# Patient Record
Sex: FEMALE | Race: BLACK OR AFRICAN AMERICAN | NOT HISPANIC OR LATINO | Employment: FULL TIME | URBAN - METROPOLITAN AREA
[De-identification: names, ages, dates, MRNs, and addresses within clinical notes are randomized per-mention and may not be internally consistent; named-entity substitution may affect disease eponyms.]

---

## 2023-03-07 ENCOUNTER — TELEPHONE (OUTPATIENT)
Dept: PRIMARY CARE | Facility: CLINIC | Age: 33
End: 2023-03-07
Payer: COMMERCIAL

## 2023-03-07 NOTE — TELEPHONE ENCOUNTER
Patient called checking on the status on her FMLA paperwork. Patient states that she gave it to someone at the front and I didn't see it in your mailbox. Please reach out to patient regarding this matter. Patient states that this is time sensitive.

## 2023-03-09 LAB
CREATININE (MG/DL) IN SER/PLAS: 0.69 MG/DL (ref 0.5–1.05)
GFR FEMALE: >90 ML/MIN/1.73M2

## 2023-04-07 ENCOUNTER — CLINICAL SUPPORT (OUTPATIENT)
Dept: PRIMARY CARE | Facility: CLINIC | Age: 33
End: 2023-04-07
Payer: COMMERCIAL

## 2023-04-07 VITALS
OXYGEN SATURATION: 98 % | HEART RATE: 99 BPM | DIASTOLIC BLOOD PRESSURE: 89 MMHG | SYSTOLIC BLOOD PRESSURE: 132 MMHG | RESPIRATION RATE: 18 BRPM

## 2023-04-07 DIAGNOSIS — Z30.42 DEPO-PROVERA CONTRACEPTIVE STATUS: ICD-10-CM

## 2023-04-07 DIAGNOSIS — Z30.42 DEPO-PROVERA CONTRACEPTIVE STATUS: Primary | ICD-10-CM

## 2023-04-07 PROCEDURE — 96372 THER/PROPH/DIAG INJ SC/IM: CPT | Performed by: FAMILY MEDICINE

## 2023-04-07 RX ORDER — IBUPROFEN 800 MG/1
800 TABLET ORAL
COMMUNITY
Start: 2015-02-23

## 2023-04-07 RX ORDER — MEDROXYPROGESTERONE ACETATE 150 MG/ML
150 INJECTION, SUSPENSION INTRAMUSCULAR ONCE
Status: COMPLETED | OUTPATIENT
Start: 2023-04-07 | End: 2023-04-07

## 2023-04-07 RX ORDER — MEDROXYPROGESTERONE ACETATE 150 MG/ML
150 INJECTION, SUSPENSION INTRAMUSCULAR
COMMUNITY
Start: 2021-08-06 | End: 2023-04-07 | Stop reason: SDUPTHER

## 2023-04-07 RX ORDER — AMLODIPINE BESYLATE 5 MG/1
5 TABLET ORAL DAILY
COMMUNITY
End: 2023-04-21 | Stop reason: ALTCHOICE

## 2023-04-07 RX ORDER — ACETAMINOPHEN 500 MG
1 TABLET ORAL DAILY
COMMUNITY
Start: 2021-05-20 | End: 2023-04-21 | Stop reason: SDUPTHER

## 2023-04-07 RX ORDER — MEDROXYPROGESTERONE ACETATE 150 MG/ML
150 INJECTION, SUSPENSION INTRAMUSCULAR
Qty: 1 ML | Refills: 0 | Status: SHIPPED | OUTPATIENT
Start: 2023-04-07

## 2023-04-07 RX ORDER — PANTOPRAZOLE SODIUM 40 MG/1
40 TABLET, DELAYED RELEASE ORAL DAILY
COMMUNITY
Start: 2023-03-09 | End: 2023-12-06 | Stop reason: ALTCHOICE

## 2023-04-07 RX ORDER — TRETINOIN 0.5 MG/G
CREAM TOPICAL
COMMUNITY
Start: 2022-08-07 | End: 2023-12-06 | Stop reason: SDUPTHER

## 2023-04-07 RX ORDER — ERYTHROMYCIN AND BENZOYL PEROXIDE 30; 50 MG/G; MG/G
GEL TOPICAL
COMMUNITY
Start: 2022-04-19 | End: 2023-12-06 | Stop reason: SDUPTHER

## 2023-04-07 RX ORDER — EPINEPHRINE 0.3 MG/.3ML
INJECTION SUBCUTANEOUS
COMMUNITY

## 2023-04-07 RX ORDER — SERTRALINE HYDROCHLORIDE 25 MG/1
25 TABLET, FILM COATED ORAL DAILY
COMMUNITY
Start: 2022-11-29 | End: 2023-04-21 | Stop reason: SDUPTHER

## 2023-04-07 RX ORDER — METOPROLOL TARTRATE 100 MG/1
TABLET ORAL
COMMUNITY
Start: 2023-03-09 | End: 2023-04-21 | Stop reason: ALTCHOICE

## 2023-04-07 RX ORDER — MEDROXYPROGESTERONE ACETATE 150 MG/ML
150 INJECTION, SUSPENSION INTRAMUSCULAR ONCE
Status: CANCELLED | OUTPATIENT
Start: 2023-04-07 | End: 2023-04-07

## 2023-04-07 RX ADMIN — MEDROXYPROGESTERONE ACETATE 150 MG: 150 INJECTION, SUSPENSION INTRAMUSCULAR at 14:01

## 2023-04-07 ASSESSMENT — PAIN SCALES - GENERAL: PAINLEVEL: 0-NO PAIN

## 2023-04-07 NOTE — PROGRESS NOTES
Patient here for Depo injection. Patient received injection in Left buttocks. Tolerated it well. Patient received the calender for her next injection timeframe.

## 2023-04-11 ENCOUNTER — APPOINTMENT (OUTPATIENT)
Dept: PRIMARY CARE | Facility: CLINIC | Age: 33
End: 2023-04-11
Payer: COMMERCIAL

## 2023-04-11 PROBLEM — L70.9 ACNE: Status: ACTIVE | Noted: 2023-04-11

## 2023-04-11 PROBLEM — I10 HYPERTENSION: Status: ACTIVE | Noted: 2023-04-11

## 2023-04-11 PROBLEM — N89.8 VAGINAL DISCHARGE: Status: ACTIVE | Noted: 2023-04-11

## 2023-04-11 PROBLEM — T78.03XA ANAPHYLAXIS DUE TO FISH: Status: ACTIVE | Noted: 2023-04-11

## 2023-04-11 PROBLEM — F43.10 POST TRAUMATIC STRESS DISORDER (PTSD): Status: ACTIVE | Noted: 2023-04-11

## 2023-04-11 PROBLEM — G56.01 CARPAL TUNNEL SYNDROME OF RIGHT WRIST: Status: ACTIVE | Noted: 2023-04-11

## 2023-04-11 PROBLEM — F17.200 TOBACCO DEPENDENCE: Status: ACTIVE | Noted: 2023-04-11

## 2023-04-11 PROBLEM — F32.A DEPRESSION: Status: ACTIVE | Noted: 2023-04-11

## 2023-04-11 PROBLEM — E55.9 VITAMIN D DEFICIENCY: Status: ACTIVE | Noted: 2023-04-11

## 2023-04-11 PROBLEM — B96.89 BACTERIAL VAGINOSIS: Status: ACTIVE | Noted: 2023-04-11

## 2023-04-11 PROBLEM — M65.9 FLEXOR TENOSYNOVITIS OF FINGER: Status: ACTIVE | Noted: 2023-04-11

## 2023-04-11 PROBLEM — R07.9 CHEST PAIN: Status: ACTIVE | Noted: 2023-04-11

## 2023-04-11 PROBLEM — B35.3 TINEA PEDIS: Status: ACTIVE | Noted: 2023-04-11

## 2023-04-11 PROBLEM — F41.9 ANXIETY: Status: ACTIVE | Noted: 2023-04-11

## 2023-04-11 PROBLEM — N76.0 BACTERIAL VAGINOSIS: Status: ACTIVE | Noted: 2023-04-11

## 2023-04-11 PROBLEM — M54.50 LOW BACK PAIN: Status: ACTIVE | Noted: 2023-04-11

## 2023-04-11 PROBLEM — M65.949 FLEXOR TENOSYNOVITIS OF FINGER: Status: ACTIVE | Noted: 2023-04-11

## 2023-04-11 PROBLEM — R12 HEART BURN: Status: ACTIVE | Noted: 2023-04-11

## 2023-04-21 ENCOUNTER — OFFICE VISIT (OUTPATIENT)
Dept: PRIMARY CARE | Facility: CLINIC | Age: 33
End: 2023-04-21
Payer: COMMERCIAL

## 2023-04-21 VITALS
HEART RATE: 117 BPM | SYSTOLIC BLOOD PRESSURE: 139 MMHG | BODY MASS INDEX: 27.32 KG/M2 | WEIGHT: 170 LBS | DIASTOLIC BLOOD PRESSURE: 94 MMHG | HEIGHT: 66 IN | OXYGEN SATURATION: 97 % | TEMPERATURE: 97.8 F

## 2023-04-21 DIAGNOSIS — E55.9 VITAMIN D DEFICIENCY: ICD-10-CM

## 2023-04-21 DIAGNOSIS — F41.9 ANXIETY: ICD-10-CM

## 2023-04-21 DIAGNOSIS — I10 PRIMARY HYPERTENSION: Primary | ICD-10-CM

## 2023-04-21 PROCEDURE — 3075F SYST BP GE 130 - 139MM HG: CPT | Performed by: STUDENT IN AN ORGANIZED HEALTH CARE EDUCATION/TRAINING PROGRAM

## 2023-04-21 PROCEDURE — 1036F TOBACCO NON-USER: CPT | Performed by: STUDENT IN AN ORGANIZED HEALTH CARE EDUCATION/TRAINING PROGRAM

## 2023-04-21 PROCEDURE — 99213 OFFICE O/P EST LOW 20 MIN: CPT | Performed by: STUDENT IN AN ORGANIZED HEALTH CARE EDUCATION/TRAINING PROGRAM

## 2023-04-21 PROCEDURE — 3080F DIAST BP >= 90 MM HG: CPT | Performed by: STUDENT IN AN ORGANIZED HEALTH CARE EDUCATION/TRAINING PROGRAM

## 2023-04-21 RX ORDER — ACETAMINOPHEN 500 MG
50 TABLET ORAL DAILY
Qty: 30 CAPSULE | Refills: 3 | Status: SHIPPED | OUTPATIENT
Start: 2023-04-21 | End: 2023-12-13 | Stop reason: SDUPTHER

## 2023-04-21 RX ORDER — SERTRALINE HYDROCHLORIDE 25 MG/1
25 TABLET, FILM COATED ORAL DAILY
Qty: 30 TABLET | Refills: 3 | Status: SHIPPED | OUTPATIENT
Start: 2023-04-21 | End: 2023-10-12 | Stop reason: HOSPADM

## 2023-04-21 RX ORDER — CHLORTHALIDONE 25 MG/1
25 TABLET ORAL DAILY
Qty: 30 TABLET | Refills: 11 | Status: SHIPPED | OUTPATIENT
Start: 2023-04-21 | End: 2023-12-14 | Stop reason: SDUPTHER

## 2023-04-21 ASSESSMENT — PAIN SCALES - GENERAL: PAINLEVEL: 0-NO PAIN

## 2023-04-21 NOTE — PROGRESS NOTES
Ms. Montaño is a 32 year old F with PMH notable for anxiety/depression, chronic low back pain, R carpal tunnel syndrome, and HTN presenting today for HTN follow up.     #HTN  - IO BP of 139/94  - Patient initiated on Amlodipine   - reports new side effects of ankle swelling and increasing underlining anxiety  - does not measure BP routinely at home  - denies, SOB, chest pain, palpitation, or peripheral edema     SOCIAL HISTORY:   Tobacco denies  ETOH denies  Drugs denies  Occupation: [employer]     REVIEW OF SYSTEMS:   No fevers, chills, weight is stable  No skin lesions  No HA, SZ, LOC  No CP, chest pressure  No cough, SOB   No ABD pain, nausea, vomiting  No urinary urgency, dysuria, urinary frequency  No BRBPR, melena, hematuria  No bleeding      PHYSICAL EXAMINATION:   Gen: NAD, non-toxic  HEENT: WNL  Chest: no inc WOB, CTABL, no wheeze/crackles/rhonchi   CVS: RRR, no murur appreciated   Abd: soft, NT/ND, +BS  Ext: no edema, nontender  Skin: Dry, warm, good condition  Neuro: grossly normal, CN intact, KIERRA x 4  Psy: Mood and affect appropriate      ASSESSMENT:  Ms. Montaño is a 32 year old F with PMH notable for anxiety/depression, chronic low back pain, R carpal tunnel syndrome, and HTN presenting today for HTN follow up. Plan as below:     PLAN:  - FMLA updated  - Will change Amlodipine to Chlorthalidone 25 mg every day   - Patient to keep home log for BP goal of <120/80  - RFP will be checked on next visit   - Medication list reviewed and renewed as needed     RTC: 1 month for BP follow up    Discussed with Dr. Mcgarry,     Sascha Linton MD  Family Medicine, PGY3  [facility]

## 2023-04-24 NOTE — PROGRESS NOTES
I reviewed with the resident the medical history and the resident’s findings on physical examination.  I discussed with the resident the patient’s diagnosis and concur with the treatment plan as documented in the resident note.     José Miguel Mcgarry MD       Patient : Darwin Archer Jr. Age: 64 year old Sex: male   MRN: [id number] Encounter Date: 5/21/2021      History     Chief Complaint   Patient presents with   • Penis Pain     HPI    64-year-old gentleman reports the ER complaint of noting blood on his underwear from the tip of his penis this evening.  Patient has history of cerebral palsy he is ambulatory at times for short distance but mainly gets around in a wheelchair.  Reports that he was out shopping for long hours today when he got home he felt urge to void on his drive home took him several minutes to get his wheelchair out of his vehicle to get inside at which time he decided tip pinch is penis to avoid urinating on himself as he got into the house.  Reports that he was pinching the tip of his penis for roughly 4 minutes and then after voiding he is noticed a small amount of blood at the tip of his penis.  This evening he has had blood on his undergarments on a few occasions.  He denies any foul-smelling or odd color urine.  Denies flank pain.  Does report some burning from the tip of his penis now on urination.  Denies blood in his urine however sees blood in his undergarments.  Denies tenderness or pain to the penis at rest or testicles.    Allergies   Allergen Reactions   • Msg [Monosodium Glutamate   (Food Or Med)] Other (See Comments)     Abdominal pain, diarrhea   • Peppers   (Food) Other (See Comments)     Diarrhea, abdominal pain        Discharge Medication List as of 5/22/2021 12:24 AM      Prior to Admission Medications    Details   loratadine (CLARITIN) 10 MG tablet Take 10 mg by mouth daily.Historical Med      Multiple Vitamins-Minerals (vitamin - therapeutic multivitamins w/minerals) tablet Take by mouth daily.Historical Med      Ascorbic Acid (Vitamin C) 500 MG Cap Historical Med      cholecalciferol (cholecalciferol) 25 mcg (1,000 units) tablet Take by mouth daily.Historical Med, Oral, DAILYPer the FDA, the units of measure for vitamin D  have changed from international units (IUs) to metric units (eg, micrograms or milligrams). It is advised to order in metric units. See below for common equi valencies:   10 mcg = 400 units   25 mcg = 1,000 units   125 mcg = 5,000 units   200 mcg = 8,000 units   1.25 mg = 50,000 units      acetaminophen (TYLENOL) 500 MG tablet Take 500 mg by mouth as needed for Pain.Historical Med      eszopiclone (LUNESTA) 1 MG Tab Take 1 tablet by mouth at bedtime as needed (insomnia).Eprescribe, Disp-3 tablet, R-0      gabapentin (NEURONTIN) 300 MG capsule 300 mg by mouth in morning 900 mg hsEprescribe, Disp-120 capsule, R-3      lamoTRIgine (LaMICtal) 25 MG tablet Take 2 tablets by mouth daily.Eprescribe, Disp-60 tablet, R-3      empagliflozin (Jardiance) 10 MG tablet Take 1 tablet by mouth daily (before breakfast).Eprescribe, Disp-90 tablet,R-1      metFORMIN (GLUCOPHAGE-XR) 500 MG 24 hr tablet Take 4 tablets by mouth daily (with dinner).Eprescribe, Disp-360 tablet,R-1      atorvastatin (LIPITOR) 40 MG tablet Take 1 tablet by mouth daily.Eprescribe, Disp-90 tablet,R-1      levothyroxine 50 MCG tablet Take 1 tablet by mouth daily.Eprescribe, Disp-90 tablet,R-1      finasteride (PROSCAR) 5 MG tablet Take 1 tablet by mouth daily.Eprescribe, Disp-90 tablet,R-1      oxybutynin (DITROPAN XL) 15 MG 24 hr tablet Take 1 tablet by mouth daily.Eprescribe, Disp-90 tablet,R-1      omeprazole (PrilOSEC) 20 MG capsule Take 1 capsule by mouth daily.Script Not Printed, Disp-90 capsule,R-3      fluoxetine (PROzac) 20 MG capsule Take 1 capsule by mouth daily.Eprescribe, Disp-90 capsule,R-3Please cancel 40 mg prescription  Attempting dose reduction and titration lamotrigine into the picture      pramipexole (MIRAPEX) 0.25 MG tablet Take 1 tablet by mouth every other day.Eprescribe, Disp-10 tablet,R-0Attempting cross titration to gabapentin      NON FORMULARY Tumeric 500 mg 1 in the morningHistorical Med      DISPENSE 1 Units by Other route daily.  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   • Blood transfusion without reported diagnosis    • BPH (benign prostatic hyperplasia)    • Branch retinal vein occlusion, right eye    • Chronic sinusitis    • Colon polyp    • CP (cerebral palsy) (CMS/HCC)    • Depressive disorder    • Diabetic retinopathy (CMS/HCC)    • Diverticulosis    • Fatty liver    • GERD (gastroesophageal reflux disease)    • Hepatitis B, chronic (CMS/HCC)    • Hyperlipidemia    • Liver disease     Hepatitis B   • Low testosterone    • Neuromuscular disorder (CMS/HCC)    • JOSEPH (obstructive sleep apnea)     CPAP   • Otitis media    • Overactive bladder    • Pneumonia    • RLS (restless legs syndrome)    • Sleep-related hypoventilation due to medical condition    • Small bowel obstruction (CMS/HCC)    • Subclinical hypothyroidism    • Tinea pedis    • Type 2 diabetes mellitus (CMS/HCC)        Past Surgical History:   Procedure Laterality Date   • COLONOSCOPY  09/11/2018    Per Dr. Poe: Colon polyp. The pathology results demonstrated Tubular adenoma. Schedule repeat Colonoscopy in 5 years.   • COLONOSCOPY REMOVE LESION BY SNARE  7/19/12   • COLONOSCOPY W/ POLYPECTOMY  7/14/15   • ESOPHAGOGASTRODUODENOSCOPY  04/16/2021    Repeat in 1 year - Dr. Poe   • HAMSTRING LENGTHENING  1968   • HERNIA REPAIR      hernia done, then came back 6 months for scar tissue removal   • LUMBAR FUSION  1991   • TRANSURETHRAL RESECTION OF PROSTATE  2014       Family History   Problem Relation Age of Onset   • High cholesterol Mother    • Cancer, Breast Mother    • Psychiatric Mother    • Diabetes Father    • Heart disease Father    • High cholesterol Father    • Cancer Father    • Hypertension Father    • Liver Disease Father    • High cholesterol Sister    • Heart Sister    •  Hypertension Sister    • Sleep Apnea Sister    • Hyperlipidemia Maternal Grandmother    • Dementia/Alzheimers Maternal Grandmother    • Hyperlipidemia Maternal Grandfather    • Sleep Apnea Brother    • HIV Brother    • Heart Brother    • Cancer, Prostate Brother        Social History     Tobacco Use   • Smoking status: Never Smoker   • Smokeless tobacco: Never Used   Substance Use Topics   • Alcohol use: No   • Drug use: No     Comment: Soda 2 soda in the AM and caffeinated drinks in the afternoon. Last at 4 PM       E-cigarette/Vaping   • E-Cigarette/Vaping Use Never Used      E-Cigarette/Vaping Substances & Devices       Review of Systems   Constitutional: Negative for chills, fatigue and fever.   Respiratory: Negative for cough, chest tightness, shortness of breath and wheezing.    Cardiovascular: Negative for chest pain and palpitations.   Gastrointestinal: Negative for abdominal pain, diarrhea, nausea and vomiting.   Genitourinary: Positive for dysuria. Negative for decreased urine volume, difficulty urinating, flank pain, frequency, hematuria (denies hematuria but notes blood from tip of penis on undergarments.), penile pain, penile swelling, scrotal swelling, testicular pain and urgency.   Musculoskeletal: Negative for arthralgias.   Skin: Negative for rash.   Neurological: Negative for dizziness, syncope, weakness, light-headedness and headaches.   All other systems reviewed and are negative.      Physical Exam     ED Triage Vitals [05/21/21 2328]   ED Triage Vitals Group      Temp 98.3 °F (36.8 °C)      Heart Rate 81      Resp 16      BP (!) 159/89      SpO2 92 %      EtCO2 mmHg       Height 5' 7\" (1.702 m)      Weight 200 lb (90.7 kg)      Weight Scale Used Standing scale      BMI (Calculated) 31.32      IBW/kg (Calculated) 66.1       Physical Exam   Constitutional: He appears well-developed and well-nourished. No distress.   HENT:   Head: Normocephalic and atraumatic.   Eyes: EOM are normal.    Cardiovascular: Normal rate, regular rhythm and normal heart sounds.   No murmur heard.  Pulmonary/Chest: Effort normal and breath sounds normal. No respiratory distress. He has no wheezes.   Abdominal: Soft. Bowel sounds are normal. He exhibits no distension. There is no abdominal tenderness. There is no rebound.   Genitourinary:    Penis normal.         No phimosis, paraphimosis, hypospadias, penile erythema or penile tenderness. No discharge found.   Neurological: He is alert.   Skin: Skin is warm. He is not diaphoretic.   Nursing note and vitals reviewed.      ED Course     Procedures    Lab Results     No results found for this visit on 05/21/21.        Radiology Results     Imaging Results    None         ED Medication Orders (From admission, onward)    None                   MDM    Patient here with stated symptoms as dictated above.  Suspect urethral irritation and trauma secondary patient pinching his penis today as described above.  Recommended that he did not perform this maneuver again in the future as it will cause significant irritation once again.  I suspect that his dysuria is related to the urethral irritation that he has self-inflicted today.  However, he has been voiding normally since injury.  His physical exam was relatively unremarkable and reassuring.  Recommended that he return to the emergency department if he is unable to void, has increasing pain or any additional concerning symptoms.  Otherwise he has a follow-up with his primary care doctor.  Patient is agreeable.  All questions were answered prior to discharge.          Follow Up:  Brett Levin MD  [facility]  [address] [facility] [id number] [phone]      As needed     Patient was instructed to return to the ED immediately if symptoms worsen or any new unusual symptoms arise. All questions and concerns were addressed.  Patient understands their diagnosis and is comfortable and agreeable with treatment plan as  dictated above.      Treatment:  Discharge Medication List as of 5/22/2021 12:24 AM            Closure:  The patient understands that this is a provisional diagnosis. Provisional diagnosis can and do change. The diagnosis that you are discharged with today is based on the symptoms with which you presented today. Disease processes can and do change with time.  If any new symptoms occur or worsen, you should seek immediate attention for re-evaluation      Clinical Impression     ED Diagnosis   1. Trauma of urethra, initial encounter         Disposition        Discharge 5/22/2021 12:22 AM  Darwin Archer Jr. discharge to home/self care.                         Eriberto Chiu PA-C  05/23/21 0939

## 2023-04-27 DIAGNOSIS — Z00.00 HEALTH CARE MAINTENANCE: Primary | ICD-10-CM

## 2023-07-03 ENCOUNTER — CLINICAL SUPPORT (OUTPATIENT)
Dept: PRIMARY CARE | Facility: CLINIC | Age: 33
End: 2023-07-03
Payer: COMMERCIAL

## 2023-07-03 VITALS
BODY MASS INDEX: 26.66 KG/M2 | TEMPERATURE: 97.8 F | SYSTOLIC BLOOD PRESSURE: 135 MMHG | DIASTOLIC BLOOD PRESSURE: 85 MMHG | WEIGHT: 165.2 LBS | HEART RATE: 96 BPM

## 2023-07-03 DIAGNOSIS — Z30.42 DEPO-PROVERA CONTRACEPTIVE STATUS: Primary | ICD-10-CM

## 2023-07-03 PROCEDURE — 96372 THER/PROPH/DIAG INJ SC/IM: CPT | Performed by: STUDENT IN AN ORGANIZED HEALTH CARE EDUCATION/TRAINING PROGRAM

## 2023-07-03 RX ORDER — MEDROXYPROGESTERONE ACETATE 150 MG/ML
150 INJECTION, SUSPENSION INTRAMUSCULAR ONCE
Status: COMPLETED | OUTPATIENT
Start: 2023-07-03 | End: 2023-07-03

## 2023-07-03 RX ADMIN — MEDROXYPROGESTERONE ACETATE 150 MG: 150 INJECTION, SUSPENSION INTRAMUSCULAR at 13:59

## 2023-07-03 NOTE — PROGRESS NOTES
Patient was here to get a Depo shot. Patient received Depo in the right Gluteal region and tolerated it well. Patient was given the Calendar and instructed to schedule a nurse visit for next Depo in three months.

## 2023-08-07 DIAGNOSIS — M41.9 SCOLIOSIS, UNSPECIFIED SCOLIOSIS TYPE, UNSPECIFIED SPINAL REGION: Primary | ICD-10-CM

## 2023-08-07 RX ORDER — CYCLOBENZAPRINE HCL 10 MG
10 TABLET ORAL NIGHTLY PRN
Qty: 30 TABLET | Refills: 2 | Status: SHIPPED | OUTPATIENT
Start: 2023-08-07 | End: 2023-10-12 | Stop reason: HOSPADM

## 2023-09-27 ENCOUNTER — CLINICAL SUPPORT (OUTPATIENT)
Dept: PRIMARY CARE | Facility: CLINIC | Age: 33
End: 2023-09-27
Payer: COMMERCIAL

## 2023-09-27 VITALS — DIASTOLIC BLOOD PRESSURE: 89 MMHG | SYSTOLIC BLOOD PRESSURE: 125 MMHG

## 2023-09-27 DIAGNOSIS — Z30.42 ENCOUNTER FOR SURVEILLANCE OF INJECTABLE CONTRACEPTIVE: Primary | ICD-10-CM

## 2023-09-27 LAB — PREGNANCY TEST URINE, POC: NEGATIVE

## 2023-09-27 PROCEDURE — 96372 THER/PROPH/DIAG INJ SC/IM: CPT | Performed by: STUDENT IN AN ORGANIZED HEALTH CARE EDUCATION/TRAINING PROGRAM

## 2023-09-27 PROCEDURE — 81025 URINE PREGNANCY TEST: CPT | Performed by: FAMILY MEDICINE

## 2023-09-27 RX ORDER — MEDROXYPROGESTERONE ACETATE 150 MG/ML
150 INJECTION, SUSPENSION INTRAMUSCULAR ONCE
Status: COMPLETED | OUTPATIENT
Start: 2023-09-27 | End: 2023-09-27

## 2023-09-27 RX ADMIN — MEDROXYPROGESTERONE ACETATE 150 MG: 150 INJECTION, SUSPENSION INTRAMUSCULAR at 13:59

## 2023-09-27 NOTE — PROGRESS NOTES
Pt in to receive Depo. B/P WNL, no need for provider follow-up at this time. Pregnancy test negative. Depo administered to right buttock without event. Calendar given for next scheduling window for provider visit next administration. Encouraged pt to schedule with provider prior to leaving clinic. Pt agrees.

## 2023-10-11 ENCOUNTER — ANESTHESIA EVENT (OUTPATIENT)
Dept: OPERATING ROOM | Facility: CLINIC | Age: 33
End: 2023-10-11
Payer: COMMERCIAL

## 2023-10-12 ENCOUNTER — HOSPITAL ENCOUNTER (OUTPATIENT)
Facility: CLINIC | Age: 33
Setting detail: OUTPATIENT SURGERY
Discharge: HOME | End: 2023-10-12
Attending: ORTHOPAEDIC SURGERY | Admitting: ORTHOPAEDIC SURGERY
Payer: COMMERCIAL

## 2023-10-12 ENCOUNTER — ANESTHESIA (OUTPATIENT)
Dept: OPERATING ROOM | Facility: CLINIC | Age: 33
End: 2023-10-12
Payer: COMMERCIAL

## 2023-10-12 VITALS
BODY MASS INDEX: 26.57 KG/M2 | DIASTOLIC BLOOD PRESSURE: 93 MMHG | HEART RATE: 81 BPM | OXYGEN SATURATION: 95 % | HEIGHT: 66 IN | RESPIRATION RATE: 16 BRPM | TEMPERATURE: 96.8 F | SYSTOLIC BLOOD PRESSURE: 123 MMHG | WEIGHT: 165.34 LBS

## 2023-10-12 DIAGNOSIS — G56.01 CARPAL TUNNEL SYNDROME ON RIGHT: Primary | ICD-10-CM

## 2023-10-12 PROBLEM — R07.9 CHEST PAIN: Status: RESOLVED | Noted: 2023-04-11 | Resolved: 2023-10-12

## 2023-10-12 LAB — PREGNANCY TEST URINE, POC: NEGATIVE

## 2023-10-12 PROCEDURE — 2500000004 HC RX 250 GENERAL PHARMACY W/ HCPCS (ALT 636 FOR OP/ED): Mod: SE

## 2023-10-12 PROCEDURE — 3600000003 HC OR TIME - INITIAL BASE CHARGE - PROCEDURE LEVEL THREE: Performed by: ORTHOPAEDIC SURGERY

## 2023-10-12 PROCEDURE — 2580000001 HC RX 258 IV SOLUTIONS: Mod: SE

## 2023-10-12 PROCEDURE — 3700000001 HC GENERAL ANESTHESIA TIME - INITIAL BASE CHARGE: Performed by: ORTHOPAEDIC SURGERY

## 2023-10-12 PROCEDURE — 2500000005 HC RX 250 GENERAL PHARMACY W/O HCPCS: Mod: SE | Performed by: ORTHOPAEDIC SURGERY

## 2023-10-12 PROCEDURE — 7100000009 HC PHASE TWO TIME - INITIAL BASE CHARGE: Performed by: ORTHOPAEDIC SURGERY

## 2023-10-12 PROCEDURE — 64721 CARPAL TUNNEL SURGERY: CPT | Performed by: ORTHOPAEDIC SURGERY

## 2023-10-12 PROCEDURE — 2500000004 HC RX 250 GENERAL PHARMACY W/ HCPCS (ALT 636 FOR OP/ED): Mod: SE | Performed by: ORTHOPAEDIC SURGERY

## 2023-10-12 PROCEDURE — 2580000001 HC RX 258 IV SOLUTIONS: Mod: SE | Performed by: STUDENT IN AN ORGANIZED HEALTH CARE EDUCATION/TRAINING PROGRAM

## 2023-10-12 PROCEDURE — 3700000002 HC GENERAL ANESTHESIA TIME - EACH INCREMENTAL 1 MINUTE: Performed by: ORTHOPAEDIC SURGERY

## 2023-10-12 PROCEDURE — A64721 PR REVISE MEDIAN N/CARPAL TUNNEL SURG

## 2023-10-12 PROCEDURE — 2720000007 HC OR 272 NO HCPCS: Performed by: ORTHOPAEDIC SURGERY

## 2023-10-12 PROCEDURE — A4217 STERILE WATER/SALINE, 500 ML: HCPCS | Mod: SE | Performed by: ORTHOPAEDIC SURGERY

## 2023-10-12 PROCEDURE — 3600000008 HC OR TIME - EACH INCREMENTAL 1 MINUTE - PROCEDURE LEVEL THREE: Performed by: ORTHOPAEDIC SURGERY

## 2023-10-12 PROCEDURE — 7100000010 HC PHASE TWO TIME - EACH INCREMENTAL 1 MINUTE: Performed by: ORTHOPAEDIC SURGERY

## 2023-10-12 RX ORDER — MIDAZOLAM HYDROCHLORIDE 1 MG/ML
INJECTION, SOLUTION INTRAMUSCULAR; INTRAVENOUS AS NEEDED
Status: DISCONTINUED | OUTPATIENT
Start: 2023-10-12 | End: 2023-10-12

## 2023-10-12 RX ORDER — BUPIVACAINE HYDROCHLORIDE 5 MG/ML
INJECTION, SOLUTION PERINEURAL AS NEEDED
Status: DISCONTINUED | OUTPATIENT
Start: 2023-10-12 | End: 2023-10-12 | Stop reason: HOSPADM

## 2023-10-12 RX ORDER — PROPOFOL 10 MG/ML
INJECTION, EMULSION INTRAVENOUS CONTINUOUS PRN
Status: DISCONTINUED | OUTPATIENT
Start: 2023-10-12 | End: 2023-10-12

## 2023-10-12 RX ORDER — SODIUM CHLORIDE 0.9 G/100ML
IRRIGANT IRRIGATION AS NEEDED
Status: DISCONTINUED | OUTPATIENT
Start: 2023-10-12 | End: 2023-10-12 | Stop reason: HOSPADM

## 2023-10-12 RX ORDER — FENTANYL CITRATE 50 UG/ML
25 INJECTION, SOLUTION INTRAMUSCULAR; INTRAVENOUS EVERY 5 MIN PRN
Status: DISCONTINUED | OUTPATIENT
Start: 2023-10-12 | End: 2023-10-12 | Stop reason: HOSPADM

## 2023-10-12 RX ORDER — HYDRALAZINE HYDROCHLORIDE 20 MG/ML
5 INJECTION INTRAMUSCULAR; INTRAVENOUS EVERY 30 MIN PRN
Status: DISCONTINUED | OUTPATIENT
Start: 2023-10-12 | End: 2023-10-12 | Stop reason: HOSPADM

## 2023-10-12 RX ORDER — HYDROCODONE BITARTRATE AND ACETAMINOPHEN 5; 325 MG/1; MG/1
1 TABLET ORAL EVERY 6 HOURS PRN
Qty: 14 TABLET | Refills: 0 | Status: SHIPPED | OUTPATIENT
Start: 2023-10-12 | End: 2023-12-06 | Stop reason: ALTCHOICE

## 2023-10-12 RX ORDER — LIDOCAINE HYDROCHLORIDE 10 MG/ML
0.1 INJECTION, SOLUTION EPIDURAL; INFILTRATION; INTRACAUDAL; PERINEURAL ONCE
Status: DISCONTINUED | OUTPATIENT
Start: 2023-10-12 | End: 2023-10-12 | Stop reason: HOSPADM

## 2023-10-12 RX ORDER — OXYCODONE AND ACETAMINOPHEN 5; 325 MG/1; MG/1
1 TABLET ORAL EVERY 4 HOURS PRN
Status: DISCONTINUED | OUTPATIENT
Start: 2023-10-12 | End: 2023-10-12 | Stop reason: HOSPADM

## 2023-10-12 RX ORDER — PROPOFOL 10 MG/ML
INJECTION, EMULSION INTRAVENOUS AS NEEDED
Status: DISCONTINUED | OUTPATIENT
Start: 2023-10-12 | End: 2023-10-12

## 2023-10-12 RX ORDER — ONDANSETRON HYDROCHLORIDE 2 MG/ML
4 INJECTION, SOLUTION INTRAVENOUS ONCE AS NEEDED
Status: DISCONTINUED | OUTPATIENT
Start: 2023-10-12 | End: 2023-10-12 | Stop reason: HOSPADM

## 2023-10-12 RX ORDER — CEFAZOLIN 1 G/1
INJECTION, POWDER, FOR SOLUTION INTRAVENOUS AS NEEDED
Status: DISCONTINUED | OUTPATIENT
Start: 2023-10-12 | End: 2023-10-12

## 2023-10-12 RX ORDER — ALBUTEROL SULFATE 0.83 MG/ML
2.5 SOLUTION RESPIRATORY (INHALATION) ONCE AS NEEDED
Status: DISCONTINUED | OUTPATIENT
Start: 2023-10-12 | End: 2023-10-12 | Stop reason: HOSPADM

## 2023-10-12 RX ORDER — ACETAMINOPHEN 325 MG/1
650 TABLET ORAL EVERY 4 HOURS PRN
Status: DISCONTINUED | OUTPATIENT
Start: 2023-10-12 | End: 2023-10-12 | Stop reason: HOSPADM

## 2023-10-12 RX ORDER — FENTANYL CITRATE 50 UG/ML
50 INJECTION, SOLUTION INTRAMUSCULAR; INTRAVENOUS EVERY 5 MIN PRN
Status: DISCONTINUED | OUTPATIENT
Start: 2023-10-12 | End: 2023-10-12 | Stop reason: HOSPADM

## 2023-10-12 RX ORDER — SODIUM CHLORIDE, SODIUM LACTATE, POTASSIUM CHLORIDE, CALCIUM CHLORIDE 600; 310; 30; 20 MG/100ML; MG/100ML; MG/100ML; MG/100ML
100 INJECTION, SOLUTION INTRAVENOUS CONTINUOUS
Status: DISCONTINUED | OUTPATIENT
Start: 2023-10-12 | End: 2023-10-12 | Stop reason: HOSPADM

## 2023-10-12 RX ORDER — ONDANSETRON HYDROCHLORIDE 2 MG/ML
INJECTION, SOLUTION INTRAVENOUS AS NEEDED
Status: DISCONTINUED | OUTPATIENT
Start: 2023-10-12 | End: 2023-10-12

## 2023-10-12 RX ORDER — CEFAZOLIN SODIUM 2 G/100ML
2 INJECTION, SOLUTION INTRAVENOUS ONCE
Status: DISCONTINUED | OUTPATIENT
Start: 2023-10-12 | End: 2023-10-12 | Stop reason: HOSPADM

## 2023-10-12 RX ORDER — LIDOCAINE HYDROCHLORIDE 10 MG/ML
INJECTION, SOLUTION EPIDURAL; INFILTRATION; INTRACAUDAL; PERINEURAL AS NEEDED
Status: DISCONTINUED | OUTPATIENT
Start: 2023-10-12 | End: 2023-10-12 | Stop reason: HOSPADM

## 2023-10-12 RX ORDER — FENTANYL CITRATE 50 UG/ML
INJECTION, SOLUTION INTRAMUSCULAR; INTRAVENOUS AS NEEDED
Status: DISCONTINUED | OUTPATIENT
Start: 2023-10-12 | End: 2023-10-12

## 2023-10-12 RX ADMIN — CEFAZOLIN 2 G: 330 INJECTION, POWDER, FOR SOLUTION INTRAMUSCULAR; INTRAVENOUS at 07:38

## 2023-10-12 RX ADMIN — PROPOFOL 40 MG: 10 INJECTION, EMULSION INTRAVENOUS at 07:34

## 2023-10-12 RX ADMIN — SODIUM CHLORIDE, POTASSIUM CHLORIDE, SODIUM LACTATE AND CALCIUM CHLORIDE 100 ML/HR: 600; 310; 30; 20 INJECTION, SOLUTION INTRAVENOUS at 07:15

## 2023-10-12 RX ADMIN — PROPOFOL 100 MCG/KG/MIN: 10 INJECTION, EMULSION INTRAVENOUS at 07:34

## 2023-10-12 RX ADMIN — ONDANSETRON 4 MG: 2 INJECTION, SOLUTION INTRAMUSCULAR; INTRAVENOUS at 07:58

## 2023-10-12 RX ADMIN — SODIUM CHLORIDE, POTASSIUM CHLORIDE, SODIUM LACTATE AND CALCIUM CHLORIDE: 600; 310; 30; 20 INJECTION, SOLUTION INTRAVENOUS at 07:28

## 2023-10-12 RX ADMIN — DEXMEDETOMIDINE HYDROCHLORIDE 20 MCG: 100 INJECTION, SOLUTION INTRAVENOUS at 07:32

## 2023-10-12 RX ADMIN — MIDAZOLAM 2 MG: 1 INJECTION INTRAMUSCULAR; INTRAVENOUS at 07:28

## 2023-10-12 RX ADMIN — FENTANYL CITRATE 50 MCG: 0.05 INJECTION, SOLUTION INTRAMUSCULAR; INTRAVENOUS at 07:34

## 2023-10-12 ASSESSMENT — PAIN SCALES - GENERAL
PAINLEVEL_OUTOF10: 0 - NO PAIN

## 2023-10-12 ASSESSMENT — PAIN - FUNCTIONAL ASSESSMENT
PAIN_FUNCTIONAL_ASSESSMENT: 0-10
PAIN_FUNCTIONAL_ASSESSMENT: 0-10

## 2023-10-12 ASSESSMENT — COLUMBIA-SUICIDE SEVERITY RATING SCALE - C-SSRS
2. HAVE YOU ACTUALLY HAD ANY THOUGHTS OF KILLING YOURSELF?: NO
6. HAVE YOU EVER DONE ANYTHING, STARTED TO DO ANYTHING, OR PREPARED TO DO ANYTHING TO END YOUR LIFE?: NO
1. IN THE PAST MONTH, HAVE YOU WISHED YOU WERE DEAD OR WISHED YOU COULD GO TO SLEEP AND NOT WAKE UP?: NO

## 2023-10-12 NOTE — H&P
CHIEF COMPLAINT         Right carpal tunnel syndrome    ASSESSMENT + PLAN    Right carpal tunnel syndrome    The nature of carpal tunnel syndrome was reviewed, along with the slowly progressive natural history.  The options for management were reviewed, including night splinting, cortisone injection, or surgical carpal tunnel release.  The major benefits and risks of surgery were specifically reviewed, as was the postoperative rehabilitation course.    The patient does want to go ahead with surgery and is presenting today for that.  The procedure will be done under sedation and local        HISTORY OF PRESENT ILLNESS       Patient returns today, as directed for carpal tunnel surgery on the right.  She is having significant numbness and tingling in the right hand that is failed respond to conservative measures.  Night splinting is no longer helping.  Noted weakness during the daytime.  Not dropping objects.      PHYSICAL EXAM       Constitutional:    Appears stated age. Well-developed and well-nourished female in no acute distress.  Psychiatric:         Pleasant normal mood and affect. Behavior is appropriate for the situation.   Head:                   Normocephalic and atraumatic.  Eyes:                    Pupils are equal and round.  Cardiovascular:  2+ radial and ulnar pulses. Fingers well-perfused.  Respiratory:        Effort normal. No respiratory distress. Speaking in complete sentences.  Neurologic:       Alert and oriented to person, place, and time.  Skin:                Skin is intact, warm and dry.  Hematologic / Lymphatic:    No lymphedema or lymphangitis.    Extremities / Musculoskeletal:                Skin of the right hand and wrist remains intact with no erythema, ecchymosis, or diffuse swelling.  Normal skin drag and coloration.  Full composite finger flexion extension with flexion to close nondisplaced oblique laceration of APB and hand intrinsics with no wasting.  Positive Phalen and Durkan on  the right but negative Tinel at wrist and elbow.  Negative elbow flexion test.  Cervical range of motion is good and does not reproduce chief complaint.  Sensation intact to light touch in all distributions.  Capillary refill less than 2 seconds.      IMAGING / LABS / EMGs        None pertinent      Electronically Signed      JOHN Chris MD      Orthopaedic Hand Surgery      [phone]

## 2023-10-12 NOTE — BRIEF OP NOTE
Date: 10/12/2023  OR Location: [facility]    Name: Jovita Montaño, : 1990, Age: 33 y.o., MRN: [id number], Sex: female    Diagnosis  Pre-op Diagnosis     * Carpal tunnel syndrome, right upper limb [G56.01] Post-op Diagnosis     * Carpal tunnel syndrome, right upper limb [G56.01]     Procedures  Right carpal tunnel release      Surgeons      * Chris Chris - Primary    Resident/Fellow/Other Assistant:  No surgical staff documented.    Procedure Summary  Anesthesia: Monitor Anesthesia Care  ASA: II  Anesthesia Staff: CRNA: RIKI Burt-CRNA  Estimated Blood Loss: 2mL  Intra-op Medications:   Medication Name Total Dose   lidocaine PF (Xylocaine) 10 mg/mL (1 %) injection 5 mL   BUPivacaine HCl (Marcaine) 0.5 % (5 mg/mL) injection 5 mL   sodium chloride 0.9 % irrigation solution 250 mL              Anesthesia Record               Intraprocedure I/O Totals          Intake    Dexmedetomidine 0.00 mL    The total shown is the total volume documented since Anesthesia Start was filed.    Propofol Drip 0.00 mL    The total shown is the total volume documented since Anesthesia Start was filed.    Total Intake 0 mL          Specimen: No specimens collected     Staff:   Circulator: Terri Gil RN  Scrub Person: Keyla Worthington          Findings: tight, thick TCL    Complications:  None; patient tolerated the procedure well.     Disposition: PACU - hemodynamically stable.  Condition: stable  Specimens Collected: No specimens collected  Attending Attestation: A qualified resident physician was not available.    Chris Chris  Phone Number: [phone]

## 2023-10-12 NOTE — OP NOTE
ORTHOPEDIC OPERATIVE NOTE    Name:     Jovita Montaño  :     1990  Facility:    [facility]  Date of Surgery:   10/12/23     PREOP DX:          right Carpal Tunnel Syndrome    POSTOP DX:       Same    PROCEDURE:     right Carpal Tunnel Release    SURGEON: JR Aries MD    RESIDENT/FELLOW/ASSISTANT:  None    ANESTHESIA:    MAC Sedation + Local    ESTIMATED BLOOD LOSS :   2 ml    TOTAL FLUIDS:     500 cc LR    SPECIMEN:   None    TOURNIQUET TIME: 7 minutes at 250 mmHg    COMPLICATIONS:  None    PATIENT RETURNED TO/CONDITION:  PACU in Good      INDICATIONS:      Jovita Montaño is a 33 y.o.,  right-hand dominant female who presents with numbness and tingling in the median nerve distribution of the right hand that has failed to respond to conservative measures.  she is here for elective right carpal tunnel release.  I reminded her of surgical risks of infection; scarring; damage to nerves, tendons, or vessels; stiffness; wound healing problems; failure to relieve symptoms; recurrent symptoms; and pillar pain.  she wished to proceed.     NARRATIVE:    Following identification of the patient and confirmation of correct site of surgery and signed operative consent, she was brought to the operating room and a hand table affixed to the cart.  A light MAC sedation was administered by Anesthesia along with IV antibiotic dose.  A pneumatic tourniquet was placed high on the right arm, and the limb was prepped from fingertip to cuff with chlorhexidine, and draped free in the usual sterile fashion.  10 mL of a mix of 0.5% Marcaine and 1% lidocaine, plain, was instilled to the planned incision area. The limb was exsanguinated with an Esmarch, and the tourniquet inflated.    A standard 2 cm mini-open carpal tunnel incision was made and taken bluntly down to the palmar fascia, which was divided in line with its fibers.  Further careful blunt dissection revealed the distal edge of the transverse carpal  ligament.  The ligament was carefully, sharply, sequentially divided under direct vision as the contents of the carpal tunnel were carefully protected.  This division was done with scissors.  There was good refill of the longitudinal vessel.  The tourniquet was deflated, and pink color rapidly returned to all nailbeds.  Meticulous hemostasis was achieved with bipolar.  After final irrigation, skin was closed with 4-0 vicryl subcutaneous and 4-0 Monocryl skin stitch, and a soft dressing applied.  Patient was awakened and transferred to Recovery in stable condition.          Electronically signed  JOHN Chris MD  [phone]

## 2023-10-12 NOTE — DISCHARGE INSTRUCTIONS
Follow-Up Instructions    You will need to be seen in clinic in 10-15 days for a post-operative evaluation.  This appointment will be in the outpatient office, not at the Surgery Center.    You will need to call Colleen in my office and schedule an appointment, unless there is a previous appointment that appears on your discharge instructions.  Her phone number is [phone].  Please do not delay in calling to make this appointment.      Activity Restrictions    1)  No driving for 24 hours after surgery, due to the anesthetic.    2)  No driving or operating heavy machinery while taking narcotic pain medication.    3)  Weight bearing as tolerated.  Light use of the fingers (writing, typing, texting) is good to do.     Discharge Medications    A prescription for a narcotic pain medication (Norco) has been sent to your pharmacy of record.  I do not expect you will need this, but wanted you to have it available as an option if over-the-counter medications are not adequately controlling your pain.  Most people simply take Tylenol, Motrin, Advil, or other anti-inflammatory for the pain.  If you do end up taking the prescription medication, please try to wean yourself off this as quickly as possible.    You can add the prescription medication to the anti-inflammatories if needed, but should not add it to Tylenol, as there is already Tylenol in the prescription.    Wound care instructions:     1)  Leave operative dressing in place for 7 days.  If you shower, cover the hand with a plastic bag and elevate it so the water cannot run down into the bag.    2)  After 7 days, remove the bandage and leave the incision open to air, or cover with a simple Band-Aid.   At that point, you may let water run freely over incision when showering.  Do not scrub.  Do not soak in pool or tub, or submerge the incision until you are fully 21 days from surgery.    3)  Call if any drainage after 7 days, increased redness/warmth/swelling at  incision site, abnormal pain/tenderness of the extremity, abnormal swelling of the extremity that does not respond to elevation, shortness of breath, or chest pain.

## 2023-10-12 NOTE — ANESTHESIA PREPROCEDURE EVALUATION
Patient: Jovita Montaño    Procedure Information       Date/Time: 10/12/23 0730    Procedure: Decompression Endoscopy Median Nerve with Carpal Tunnel Release (Right: Hand)    Location: [facility] OR 02 / [facility] OR    Surgeons: Chris Chris MD            Relevant Problems   Cardiovascular   (+) Chest pain (Resolved)   (+) Hypertension      Neuro/Psych   (+) Anxiety   (+) Depression   (+) Post traumatic stress disorder (PTSD)       Clinical information reviewed:   Tobacco  Allergies  Meds   Med Hx  Surg Hx   Fam Hx  Soc Hx        NPO Detail:  NPO/Void Status  Date of Last Solid: 10/11/23  Time of Last Solid: 1900  Last Intake Type: Food         Physical Exam    Airway  Mallampati: I  Neck ROM: full     Cardiovascular   Rhythm: regular  Rate: normal     Dental - normal exam     Pulmonary   Breath sounds clear to auscultation     Abdominal            Anesthesia Plan    ASA 2     MAC     intravenous induction   Anesthetic plan and risks discussed with patient.    Plan discussed with CRNA.

## 2023-10-13 ASSESSMENT — PAIN SCALES - GENERAL: PAIN_LEVEL: 0

## 2023-10-13 NOTE — ANESTHESIA POSTPROCEDURE EVALUATION
Patient: Jovita Noonancott    Procedure Summary       Date: 10/12/23 Room / Location: [facility] OR 02 / Virtual [facility] OR    Anesthesia Start: 0728 Anesthesia Stop: 0805    Procedure: Decompression Endoscopy Median Nerve with Carpal Tunnel Release (Right: Hand) Diagnosis:       Carpal tunnel syndrome, right upper limb      (Carpal tunnel syndrome, right upper limb [G56.01])    Surgeons: Chris Chris MD Responsible Provider: MIREYA Burt    Anesthesia Type: MAC ASA Status: 2            Anesthesia Type: MAC    Vitals Value Taken Time   /93 10/12/23 0834   Temp 36 °C (96.8 °F) 10/12/23 0834   Pulse 81 10/12/23 0834   Resp 16 10/12/23 0834   SpO2 95 % 10/12/23 0834       Anesthesia Post Evaluation    Patient location during evaluation: bedside  Patient participation: complete - patient participated  Level of consciousness: awake  Pain score: 0  Pain management: adequate  Airway patency: patent  Cardiovascular status: acceptable  Respiratory status: acceptable  Hydration status: balanced        No notable events documented.

## 2023-11-07 ENCOUNTER — APPOINTMENT (OUTPATIENT)
Dept: PRIMARY CARE | Facility: CLINIC | Age: 33
End: 2023-11-07
Payer: COMMERCIAL

## 2023-11-10 ENCOUNTER — OFFICE VISIT (OUTPATIENT)
Dept: ORTHOPEDIC SURGERY | Facility: CLINIC | Age: 33
End: 2023-11-10
Payer: COMMERCIAL

## 2023-11-10 DIAGNOSIS — G56.01 CARPAL TUNNEL SYNDROME OF RIGHT WRIST: Primary | ICD-10-CM

## 2023-11-10 PROCEDURE — 3074F SYST BP LT 130 MM HG: CPT | Performed by: ORTHOPAEDIC SURGERY

## 2023-11-10 PROCEDURE — 99024 POSTOP FOLLOW-UP VISIT: CPT | Performed by: ORTHOPAEDIC SURGERY

## 2023-11-10 PROCEDURE — 1036F TOBACCO NON-USER: CPT | Performed by: ORTHOPAEDIC SURGERY

## 2023-11-10 PROCEDURE — 3079F DIAST BP 80-89 MM HG: CPT | Performed by: ORTHOPAEDIC SURGERY

## 2023-11-10 NOTE — PROGRESS NOTES
CHIEF COMPLAINT         Right hand follow up.    ASSESSMENT + PLAN    Postop day 29 from right carpal tunnel release    The incision is healing normally.  Sutures are absorbable, but surprisingly are still present.  You may get this wet, and may advance activity as pain allows.  Work on the stretching exercises that I demonstrated. Contact my office if you would like a formal occupational therapy referral.     Follow up with any concerns.        HISTORY OF PRESENT ILLNESS       Patient returns today, postop day 29 from right carpal tunnel release for her first postoperative check.  The tingling is very much improved.  She is sleeping better and is pleased with her outcome.  She is working on range of motion.  The sutures are itchy.      PHYSICAL EXAM       She remove the dressing as instructed.  Incision clean, dry, intact.  Surprisingly the Monocryl sutures are all still in place.  No surrounding erythema, fluctuance, expressible fluid, increased skin temperature, or other sign concerning for infection.  Full composite finger flexion extension.  Intact thenar motor function.  Symmetric wrist and forearm motion.  Sensation intact to light touch in all distributions.  Capillary refill less than 2 seconds.      IMAGING / LABS / EMGs    None today      Electronically Signed      JOHN Chris MD      Orthopaedic Hand Surgery      [phone]

## 2023-11-10 NOTE — LETTER
November 12, 2023       No Recipients    Patient: Jovita Montaño   YOB: 1990   Date of Visit: 11/10/2023       Dear Dr. Sun Recipients:    Thank you for referring Jovita Montaño to me for evaluation. Below are my notes for this consultation.  If you have questions, please do not hesitate to call me. I look forward to following your patient along with you.       Sincerely,     Chris Chris MD      CC:   No Recipients  ______________________________________________________________________________________    CHIEF COMPLAINT         Right hand follow up.    ASSESSMENT + PLAN    Postop day 29 from right carpal tunnel release    The incision is healing normally.  Sutures are absorbable, but surprisingly are still present.  You may get this wet, and may advance activity as pain allows.  Work on the stretching exercises that I demonstrated. Contact my office if you would like a formal occupational therapy referral.     Follow up with any concerns.        HISTORY OF PRESENT ILLNESS       Patient returns today, postop day 29 from right carpal tunnel release for her first postoperative check.  The tingling is very much improved.  She is sleeping better and is pleased with her outcome.  She is working on range of motion.  The sutures are itchy.      PHYSICAL EXAM       She remove the dressing as instructed.  Incision clean, dry, intact.  Surprisingly the Monocryl sutures are all still in place.  No surrounding erythema, fluctuance, expressible fluid, increased skin temperature, or other sign concerning for infection.  Full composite finger flexion extension.  Intact thenar motor function.  Symmetric wrist and forearm motion.  Sensation intact to light touch in all distributions.  Capillary refill less than 2 seconds.      IMAGING / LABS / EMGs    None today      Electronically Signed      JOHN Chris MD      Orthopaedic Hand Surgery      [phone]

## 2023-11-12 PROBLEM — G56.01 CARPAL TUNNEL SYNDROME OF RIGHT WRIST: Status: ACTIVE | Noted: 2023-11-12

## 2023-12-06 ENCOUNTER — TELEMEDICINE (OUTPATIENT)
Dept: DERMATOLOGY | Facility: CLINIC | Age: 33
End: 2023-12-06
Payer: COMMERCIAL

## 2023-12-06 DIAGNOSIS — L70.0 ACNE VULGARIS: Primary | ICD-10-CM

## 2023-12-06 PROCEDURE — 99213 OFFICE O/P EST LOW 20 MIN: CPT | Performed by: DERMATOLOGY

## 2023-12-06 RX ORDER — ERYTHROMYCIN AND BENZOYL PEROXIDE 30; 50 MG/G; MG/G
GEL TOPICAL EVERY MORNING
Qty: 46.6 G | Refills: 11 | Status: SHIPPED | OUTPATIENT
Start: 2023-12-06 | End: 2024-12-05

## 2023-12-06 RX ORDER — TRETINOIN 0.5 MG/G
CREAM TOPICAL
Qty: 45 G | Refills: 11 | Status: SHIPPED | OUTPATIENT
Start: 2023-12-06

## 2023-12-06 ASSESSMENT — DERMATOLOGY QUALITY OF LIFE (QOL) ASSESSMENT
RATE HOW BOTHERED YOU ARE BY SYMPTOMS OF YOUR SKIN PROBLEM (EG, ITCHING, STINGING BURNING, HURTING OR SKIN IRRITATION): 5
RATE HOW EMOTIONALLY BOTHERED YOU ARE BY YOUR SKIN PROBLEM (FOR EXAMPLE, WORRY, EMBARRASSMENT, FRUSTRATION): 5
WHAT SINGLE SKIN CONDITION LISTED BELOW IS THE PATIENT ANSWERING THE QUALITY-OF-LIFE ASSESSMENT QUESTIONS ABOUT: ACNE
DATE THE QUALITY-OF-LIFE ASSESSMENT WAS COMPLETED: 66814
RATE HOW EMOTIONALLY BOTHERED YOU ARE BY YOUR SKIN PROBLEM (FOR EXAMPLE, WORRY, EMBARRASSMENT, FRUSTRATION): 5
RATE HOW BOTHERED YOU ARE BY EFFECTS OF YOUR SKIN PROBLEMS ON YOUR ACTIVITIES (EG, GOING OUT, ACCOMPLISHING WHAT YOU WANT, WORK ACTIVITIES OR YOUR RELATIONSHIPS WITH OTHERS): 4
RATE HOW BOTHERED YOU ARE BY EFFECTS OF YOUR SKIN PROBLEMS ON YOUR ACTIVITIES (EG, GOING OUT, ACCOMPLISHING WHAT YOU WANT, WORK ACTIVITIES OR YOUR RELATIONSHIPS WITH OTHERS): 4
RATE HOW BOTHERED YOU ARE BY SYMPTOMS OF YOUR SKIN PROBLEM (EG, ITCHING, STINGING BURNING, HURTING OR SKIN IRRITATION): 5
WHAT SINGLE SKIN CONDITION LISTED BELOW IS THE PATIENT ANSWERING THE QUALITY-OF-LIFE ASSESSMENT QUESTIONS ABOUT: ACNE

## 2023-12-06 NOTE — PROGRESS NOTES
Subjective     Jovita Montaño is a 33 y.o. female who presents for the following: Acne.     Last derm visit 3/2022 for acne. Well-controlled until ran out of Rx tretinoin 0.05 and benzamycin gel with flare of acne along cheeks and jawline that is leaving scars    She had tried clindamycin lotion previously but that did not control her acne    She is on Depo for birth control and she likes this very much      Review of Systems:  No other skin or systemic complaints other than what is documented elsewhere in the note.    The following portions of the chart were reviewed this encounter and updated as appropriate:   Allergies  Meds         Skin Cancer History  No skin cancer on file.      Specialty Problems          Dermatology Problems    Acne        Objective   Well appearing patient in no apparent distress; mood and affect are within normal limits.    A focused skin examination was performed. All findings within normal limits unless otherwise noted below.    Assessment/Plan   1. Acne vulgaris  Head - Anterior (Face)  Deep seated, inflammatory papules with associated brown macules and scarring on lower cheeks and jawline    - flaring while off topicals, re-send Rx to restart  - if this does not control her flare, consider spironolactone PO, does not want to change her birth control      erythromycin-benzoyl peroxide (Benzamycin) gel - Head - Anterior (Face)  Apply topically once daily in the morning. To acne spots on face    tretinoin (Retin-A) 0.05 % cream - Head - Anterior (Face)  Apply a pea-sized amount to the whole face at night as tolerated.    Related Procedures  Follow Up In Dermatology - Established Patient        Follow up 2-3 months acne VV

## 2023-12-11 DIAGNOSIS — M54.50 LOW BACK PAIN WITHOUT SCIATICA, UNSPECIFIED BACK PAIN LATERALITY, UNSPECIFIED CHRONICITY: Primary | ICD-10-CM

## 2023-12-11 RX ORDER — CYCLOBENZAPRINE HCL 10 MG
10 TABLET ORAL NIGHTLY PRN
Qty: 30 TABLET | Refills: 2 | Status: SHIPPED | OUTPATIENT
Start: 2023-12-11 | End: 2023-12-13 | Stop reason: SDUPTHER

## 2023-12-13 ENCOUNTER — OFFICE VISIT (OUTPATIENT)
Dept: PRIMARY CARE | Facility: CLINIC | Age: 33
End: 2023-12-13
Payer: COMMERCIAL

## 2023-12-13 VITALS
OXYGEN SATURATION: 99 % | BODY MASS INDEX: 27.29 KG/M2 | TEMPERATURE: 97.8 F | WEIGHT: 169.1 LBS | HEART RATE: 99 BPM | DIASTOLIC BLOOD PRESSURE: 95 MMHG | SYSTOLIC BLOOD PRESSURE: 143 MMHG

## 2023-12-13 DIAGNOSIS — I10 PRIMARY HYPERTENSION: ICD-10-CM

## 2023-12-13 DIAGNOSIS — M54.50 LOW BACK PAIN WITHOUT SCIATICA, UNSPECIFIED BACK PAIN LATERALITY, UNSPECIFIED CHRONICITY: ICD-10-CM

## 2023-12-13 DIAGNOSIS — Z30.42 DEPO-PROVERA CONTRACEPTIVE STATUS: Primary | ICD-10-CM

## 2023-12-13 DIAGNOSIS — E55.9 VITAMIN D DEFICIENCY: ICD-10-CM

## 2023-12-13 LAB — PREGNANCY TEST URINE, POC: NEGATIVE

## 2023-12-13 PROCEDURE — 3077F SYST BP >= 140 MM HG: CPT

## 2023-12-13 PROCEDURE — 99214 OFFICE O/P EST MOD 30 MIN: CPT

## 2023-12-13 PROCEDURE — 81025 URINE PREGNANCY TEST: CPT

## 2023-12-13 PROCEDURE — 96372 THER/PROPH/DIAG INJ SC/IM: CPT | Performed by: FAMILY MEDICINE

## 2023-12-13 PROCEDURE — 3080F DIAST BP >= 90 MM HG: CPT

## 2023-12-13 PROCEDURE — 1036F TOBACCO NON-USER: CPT

## 2023-12-13 RX ORDER — MEDROXYPROGESTERONE ACETATE 150 MG/ML
150 INJECTION, SUSPENSION INTRAMUSCULAR ONCE
Status: COMPLETED | OUTPATIENT
Start: 2023-12-13 | End: 2023-12-13

## 2023-12-13 RX ORDER — CYCLOBENZAPRINE HCL 10 MG
10 TABLET ORAL NIGHTLY PRN
Qty: 30 TABLET | Refills: 0 | Status: SHIPPED | OUTPATIENT
Start: 2023-12-13 | End: 2024-08-09

## 2023-12-13 RX ORDER — ACETAMINOPHEN 500 MG
2000 TABLET ORAL DAILY
Qty: 30 CAPSULE | Refills: 2 | Status: SHIPPED | OUTPATIENT
Start: 2023-12-13 | End: 2024-03-12

## 2023-12-13 RX ADMIN — MEDROXYPROGESTERONE ACETATE 150 MG: 150 INJECTION, SUSPENSION INTRAMUSCULAR at 14:10

## 2023-12-13 NOTE — PATIENT INSTRUCTIONS
Thank you for coming in to see us today, Ms. Jovita Montaño! It was a pleasure managing your health together.    Today in clinic, we discussed birth control and medication refills.    If we ordered bloodwork today, you can get this done at ANY  location and the results will come to me directly. The [facility] here at [facility] are walk-in (no appointment needed); [facility]'s hours are M-F 6:30a-6p and Sat 8a-12p.    If you have any questions/concerns, you can always use [facility] to message me directly. Or if you prefer, you can call the office at [phone]; if you leave a message, the office staff should translate this to a message in my inbox.    I'm looking forward to seeing you back in clinic in 2 months for your follow-up appt.    Yonis,  Dr. Janna MD

## 2023-12-13 NOTE — PROGRESS NOTES
Patient ID: Jovita Montaño is a 33 y.o. female with PMH of HTN, back pain 2/2 scoliosis who presents for Follow-up, Med Refill, and depo shot.    Assessment/Plan     Problem List Items Addressed This Visit       Depo-Provera contraceptive status      -Patient w/ long term use of Depo and has received a shot q3mo           -Last shot was in Sep and denies missed appts           -She denied recent sexual intercourse and educated patient on the possibility of still being pregnant even with a neg POCT pregnancy test if she recently had sexually intercourse.           - Emergency contraception was offered in the off chance patient wanted to retake the pregnancy test, she declined.            -Discussed the risks and benefits of medroxyprogesterone including prevention of pregnancy when consistent w/ q3mo shots. She may experience menstrual changes such as heavy bleeding, HA, weight changes, and/or amenorrhea. Discussed that the contraception is not a substitute for condoms for STI prevention and she should still use them with every sexual encounter              -Encouraged patient to continue taking supplemental vit d and calcium daily.     Cervical screening          -last pap smear for cytology 5/8/21. NILM          -cotesting pap smear for cytology and hrHPV during follow up visit     Hypertension    -Reports side effects with the 25mg tablet and has been taking half of a table daily.   -Discussed the importance of taking medication as prescribed to help prevent HTN complication   -Pt amenable to monitoring her BP at home and bring a record during next visit. In the meantime pt to take 12.5mg and diet control w/ daily exercises as tolerated      Low back pain    cyclobenzaprine (Flexeril) 10 mg tablet    Vitamin D deficiency    cholecalciferol (Vitamin D-3) 50 mcg (2,000 unit) capsule       Attending Supervision: seen and discussed with attending physician (beto listed on this note).    RTC in 3months, or earlier  as needed.    Griselda Saldivar MD   PGY1, Fam Med      Subjective     HPI    Patient presents for depo shot follow-up. No concerns toady and no contraindications for taking the contraceptive. She states she has been receiving her depo shot q4 yrs and would like to continue with this form of contraceptive. Denies intermittent spotting or heavy bleeding. LMP 4 yrs ago and has not had one since being on depo. Patient not currently sexually active.   Patient reports last pap smear for cytology 3 yrs ago.     Review of Systems   Constitutional:  Negative for activity change, chills, fatigue, fever and unexpected weight change.   HENT:  Negative for congestion, facial swelling, rhinorrhea and sore throat.    Respiratory:  Negative for apnea, cough, chest tightness, shortness of breath and wheezing.    Cardiovascular:  Negative for chest pain and palpitations.   Gastrointestinal:  Negative for abdominal distention, abdominal pain, diarrhea, nausea and vomiting.   Endocrine: Negative for cold intolerance and heat intolerance.   Genitourinary:  Negative for decreased urine volume, difficulty urinating, flank pain, pelvic pain, vaginal bleeding, vaginal discharge and vaginal pain.   Musculoskeletal:  Negative for back pain and joint swelling.   Skin:  Negative for color change and rash.   Neurological:  Negative for dizziness, light-headedness, numbness and headaches.       Past Medical History:   Diagnosis Date    Acute atopic conjunctivitis, bilateral 09/27/2017    Acute allergic conjunctivitis of both eyes    Acute vaginitis 09/20/2020    Recurrent vaginitis    Acute vaginitis 09/20/2020    Bacterial vaginosis    Contact with and (suspected) exposure to infections with a predominantly sexual mode of transmission 05/08/2018    Exposure to STD    Cutaneous abscess of face 04/01/2016    Cutaneous abscess of face    Elevated blood-pressure reading, without diagnosis of hypertension 05/11/2018    Elevated blood  pressure reading    Encounter for gynecological examination (general) (routine) with abnormal findings 06/27/2016    Abnormal gynecological examination    Encounter for immunization 05/20/2021    Immunization due    Encounter for immunization 05/20/2021    Encounter for immunization    Encounter for initial prescription of contraceptive pills 10/17/2017    Encounter for prescription of oral contraceptives    Encounter for insertion of intrauterine contraceptive device 01/29/2016    Encounter for insertion of mirena IUD    Encounter for screening for infections with a predominantly sexual mode of transmission 10/15/2021    Routine screening for STI (sexually transmitted infection)    Encounter for screening for infections with a predominantly sexual mode of transmission 10/17/2017    Routine screening for STI (sexually transmitted infection)    Other conditions influencing health status 02/12/2015    LGSIL (low grade squamous intraepithelial dysplasia)    Other problems related to lifestyle 05/08/2018    Other problems related to lifestyle    Other skin changes 04/26/2017    Papule of skin    Other symptoms and signs involving the genitourinary system     Possible urinary tract infection    Pain in right hand 10/17/2017    Pain of right hand    Personal history of other diseases of the female genital tract 12/28/2016    History of vaginitis    Personal history of other diseases of the female genital tract 06/03/2015    History of vaginal discharge    Personal history of other infectious and parasitic diseases     History of trichomoniasis    Personal history of other specified conditions 03/22/2016    History of urinary frequency    Personal history of other specified conditions 05/08/2018    History of abdominal pain    Personal history of other specified conditions 05/08/2018    History of dysuria     Past Surgical History:   Procedure Laterality Date    CT ANGIO CORONARY ART WITH HEARTFLOW IF SCORE >30%  4/10/2023     CT HEART CORONARY ANGIOGRAM [facility] CT     Family History   Problem Relation Name Age of Onset    Diabetes Other mat aunt     Other (htn) Other mat aunt      Aspirin, Crab, Fish containing products, and Penicillin g   Social History     Tobacco Use    Smoking status: Never    Smokeless tobacco: Never   Substance Use Topics    Alcohol use: Not Currently       Current Outpatient Medications on File Prior to Visit   Medication Sig Dispense Refill    EPINEPHrine 0.3 mg/0.3 mL injection syringe INJECT 0.3ML (unit dose) INTRAMUSCULARLY for anaphylaxis, go to ER if used      erythromycin-benzoyl peroxide (Benzamycin) gel Apply topically once daily in the morning. To acne spots on face 46.6 g 11    ibuprofen 800 mg tablet Take 1 tablet (800 mg) by mouth.      medroxyPROGESTERone 150 mg/mL injection Inject 1 mL (150 mg) into the shoulder, thigh, or buttocks every 3 months. 1 mL 0    tretinoin (Retin-A) 0.05 % cream Apply a pea-sized amount to the whole face at night as tolerated. 45 g 11     No current facility-administered medications on file prior to visit.        Objective   Vitals: BP (!) 143/95 (BP Location: Right arm, Patient Position: Sitting, BP Cuff Size: Adult)   Pulse 99   Temp 36.6 °C (97.8 °F) (Temporal)   Wt 76.7 kg (169 lb 1.6 oz)   SpO2 99%   BMI 27.29 kg/m²      Physical Exam  Vitals reviewed.   Constitutional:       General: She is not in acute distress.     Appearance: Normal appearance.   HENT:      Head: Atraumatic.      Right Ear: External ear normal.      Left Ear: External ear normal.      Mouth/Throat:      Mouth: Mucous membranes are moist.      Pharynx: Oropharynx is clear.   Eyes:      Extraocular Movements: Extraocular movements intact.      Conjunctiva/sclera: Conjunctivae normal.      Pupils: Pupils are equal, round, and reactive to light.   Cardiovascular:      Rate and Rhythm: Normal rate and regular rhythm.      Pulses: Normal pulses.      Heart sounds: Normal heart sounds. No  murmur heard.     No gallop.   Pulmonary:      Effort: No respiratory distress.      Breath sounds: Normal breath sounds. No wheezing or rales.   Abdominal:      General: Bowel sounds are normal.      Palpations: There is no mass.      Tenderness: There is no abdominal tenderness. There is no guarding.   Musculoskeletal:         General: No swelling. Normal range of motion.      Cervical back: Normal range of motion. No tenderness.   Lymphadenopathy:      Cervical: No cervical adenopathy.   Skin:     General: Skin is warm and dry.      Capillary Refill: Capillary refill takes less than 2 seconds.   Neurological:      Mental Status: She is alert.

## 2023-12-14 RX ORDER — CHLORTHALIDONE 25 MG/1
12.5 TABLET ORAL DAILY
Qty: 15 TABLET | Refills: 11 | Status: SHIPPED | OUTPATIENT
Start: 2023-12-14 | End: 2024-12-13

## 2023-12-22 ASSESSMENT — ENCOUNTER SYMPTOMS
FLANK PAIN: 0
COUGH: 0
WHEEZING: 0
NUMBNESS: 0
SHORTNESS OF BREATH: 0
DIARRHEA: 0
LIGHT-HEADEDNESS: 0
UNEXPECTED WEIGHT CHANGE: 0
COLOR CHANGE: 0
PALPITATIONS: 0
NAUSEA: 0
FACIAL SWELLING: 0
CHILLS: 0
HEADACHES: 0
BACK PAIN: 0
SORE THROAT: 0
FATIGUE: 0
DIFFICULTY URINATING: 0
VOMITING: 0
DIZZINESS: 0
RHINORRHEA: 0
APNEA: 0
JOINT SWELLING: 0
ABDOMINAL PAIN: 0
CHEST TIGHTNESS: 0
ACTIVITY CHANGE: 0
ABDOMINAL DISTENTION: 0
FEVER: 0

## 2023-12-23 NOTE — PROGRESS NOTES
I saw and evaluated the patient. I personally obtained the key and critical portions of the history and physical exam or was physically present for key and critical portions performed by the resident/fellow. I reviewed the resident/fellow's documentation and discussed the patient with the resident/fellow. I agree with the resident/fellow's medical decision making as documented in the note with the addition of the following:  Patient may return to continue Depo with attention to bone health, or may choose a linger acting contraceptive.     Keily Brand MD

## 2024-01-29 ENCOUNTER — APPOINTMENT (OUTPATIENT)
Dept: GASTROENTEROLOGY | Facility: HOSPITAL | Age: 34
End: 2024-01-29
Payer: COMMERCIAL

## 2024-01-29 NOTE — PROGRESS NOTES
History Of Present Illness  Jovita Montaño is a 33 y.o. female with h/o HTN, and back pain secondary to scoliosis is here today for     There are no records of previous GI consultations.  No records of EGD or Colonoscopy done in the past.     Past Medical History  She has a past medical history of Acute atopic conjunctivitis, bilateral (09/27/2017), Acute vaginitis (09/20/2020), Acute vaginitis (09/20/2020), Contact with and (suspected) exposure to infections with a predominantly sexual mode of transmission (05/08/2018), Cutaneous abscess of face (04/01/2016), Elevated blood-pressure reading, without diagnosis of hypertension (05/11/2018), Encounter for gynecological examination (general) (routine) with abnormal findings (06/27/2016), Encounter for immunization (05/20/2021), Encounter for immunization (05/20/2021), Encounter for initial prescription of contraceptive pills (10/17/2017), Encounter for insertion of intrauterine contraceptive device (01/29/2016), Encounter for screening for infections with a predominantly sexual mode of transmission (10/15/2021), Encounter for screening for infections with a predominantly sexual mode of transmission (10/17/2017), Other conditions influencing health status (02/12/2015), Other problems related to lifestyle (05/08/2018), Other skin changes (04/26/2017), Other symptoms and signs involving the genitourinary system, Pain in right hand (10/17/2017), Personal history of other diseases of the female genital tract (12/28/2016), Personal history of other diseases of the female genital tract (06/03/2015), Personal history of other infectious and parasitic diseases, Personal history of other specified conditions (03/22/2016), Personal history of other specified conditions (05/08/2018), and Personal history of other specified conditions (05/08/2018).    Surgical History  She has a past surgical history that includes CT angio coronary art with heartflow if score >30% (4/10/2023).      Social History  She reports that she has never smoked. She has never used smokeless tobacco. She reports that she does not currently use alcohol. She reports that she does not use drugs.    Family History  Family History   Problem Relation Name Age of Onset    Diabetes Other mat aunt     Other (htn) Other mat aunt         Allergies  Aspirin, Crab, Fish containing products, and Penicillin g      Review of Systems       There were no vitals taken for this visit.  Physical Exam           Relevant Results      Assessment/Plan:  {Assess/PlanSmartLinks:18537}    Kamila Ibrahim PA-C

## 2024-02-20 NOTE — PROGRESS NOTES
"History Of Present Illness  Jovita Montaño is a 33 y.o. female with h/o HTN, anxiety/depression,  and back pain secondary to scoliosis is here today for esophageal dysphagia for past 6 months.    Pt noticed esophageal dysphagia (with both solids and liquids), 2x/month when it first started, bu the past month, she has been having odynophagia and dysphagia 3x/week.  Pt admits to some nausea, but no vomiting.  However, she feels \"acid coming up\" and burning sensation right after eating with some epigastric abd pain.    Pt did take Famotidine 20mg (3 tabs daily) for past week and has noticed relief. Pt has not tried any PPIs.    There are no records of previous GI consultations.  No records of EGD or Colonoscopy done in the past.     Past Medical History  She has a past medical history of Acute atopic conjunctivitis, bilateral (09/27/2017), Acute vaginitis (09/20/2020), Acute vaginitis (09/20/2020), Contact with and (suspected) exposure to infections with a predominantly sexual mode of transmission (05/08/2018), Cutaneous abscess of face (04/01/2016), Elevated blood-pressure reading, without diagnosis of hypertension (05/11/2018), Encounter for gynecological examination (general) (routine) with abnormal findings (06/27/2016), Encounter for immunization (05/20/2021), Encounter for immunization (05/20/2021), Encounter for initial prescription of contraceptive pills (10/17/2017), Encounter for insertion of intrauterine contraceptive device (01/29/2016), Encounter for screening for infections with a predominantly sexual mode of transmission (10/15/2021), Encounter for screening for infections with a predominantly sexual mode of transmission (10/17/2017), Other conditions influencing health status (02/12/2015), Other problems related to lifestyle (05/08/2018), Other skin changes (04/26/2017), Other symptoms and signs involving the genitourinary system, Pain in right hand (10/17/2017), Personal history of other diseases of " the female genital tract (12/28/2016), Personal history of other diseases of the female genital tract (06/03/2015), Personal history of other infectious and parasitic diseases, Personal history of other specified conditions (03/22/2016), Personal history of other specified conditions (05/08/2018), and Personal history of other specified conditions (05/08/2018).    Surgical History  She has a past surgical history that includes CT angio coronary art with heartflow if score >30% (4/10/2023).   Carpal tunnel surgery (right) - Oct 2023.  Social History  She reports that she has never smoked. She has never used smokeless tobacco. She reports that she does not currently use alcohol. She reports that she does not use drugs.  Social ETOH use - 2x/month    Family History  Family History   Problem Relation Name Age of Onset    Diabetes Other mat aunt     Other (htn) Other mat aunt         Allergies  Aspirin, Crab, Fish containing products, and Penicillin g      Review of Systems   Constitutional:  Negative for appetite change and unexpected weight change.   HENT:  Positive for trouble swallowing. Negative for sore throat.    Eyes:  Negative for redness.   Respiratory:  Negative for cough and shortness of breath.    Cardiovascular:  Negative for chest pain.   Gastrointestinal:  Positive for abdominal pain (epigastric region), constipation (intermittent) and nausea. Negative for blood in stool.   Endocrine: Negative for polydipsia and polyphagia.   Genitourinary:  Negative for dysuria.   Musculoskeletal:  Negative for joint swelling.   Skin:  Negative for rash.   Neurological:  Negative for light-headedness.   Psychiatric/Behavioral:  Negative for agitation, confusion and dysphoric mood.        Physical Exam  Vitals reviewed.   Constitutional:       General: She is not in acute distress.     Appearance: She is not ill-appearing.   HENT:      Head: Normocephalic and atraumatic.   Eyes:      General: No scleral  "icterus.  Cardiovascular:      Rate and Rhythm: Regular rhythm. Tachycardia present.   Pulmonary:      Effort: Pulmonary effort is normal.   Abdominal:      General: Bowel sounds are normal. There is no distension.      Palpations: Abdomen is soft.      Tenderness: There is abdominal tenderness (epigastric area). There is no guarding or rebound.   Musculoskeletal:         General: No swelling or deformity.      Cervical back: Neck supple.   Lymphadenopathy:      Cervical: No cervical adenopathy.   Skin:     General: Skin is warm.      Coloration: Skin is not jaundiced.      Findings: No rash.   Neurological:      General: No focal deficit present.      Mental Status: She is alert. Mental status is at baseline.   Psychiatric:         Mood and Affect: Mood normal.         Behavior: Behavior normal.         Thought Content: Thought content normal.     Visit Vitals  /84   Pulse (!) 115   Temp 36.3 °C (97.4 °F)   Resp 18   Ht 1.676 m (5' 6\")   Wt 78 kg (172 lb)   SpO2 98%   BMI 27.76 kg/m²   OB Status Injection   Smoking Status Never   BSA 1.91 m²            Relevant Results      Assessment/Plan:      1) Esophageal dysphagia with c/o heartburn and belching -  Will obtain esophagram to assess for any structural anomaly and active reflux. We will likely pt pt on daily PPI for 6 wks,but I would like to do stool testing below prior to initiation of treatment.   We also discussed GERD diet and lifestyle modifications.    2) Epigastric abd pain and nausea -  Will obtain H.pylori stoo Ag test. If positive,we will tx accordingly. If negative, then we will start daily PPI.    More recs to follow pending on above studies .    Kamila Ibrahim PA-C  "

## 2024-02-21 ENCOUNTER — OFFICE VISIT (OUTPATIENT)
Dept: GASTROENTEROLOGY | Facility: HOSPITAL | Age: 34
End: 2024-02-21
Payer: COMMERCIAL

## 2024-02-21 VITALS
BODY MASS INDEX: 27.64 KG/M2 | SYSTOLIC BLOOD PRESSURE: 128 MMHG | WEIGHT: 172 LBS | OXYGEN SATURATION: 98 % | TEMPERATURE: 97.4 F | DIASTOLIC BLOOD PRESSURE: 84 MMHG | HEART RATE: 115 BPM | HEIGHT: 66 IN | RESPIRATION RATE: 18 BRPM

## 2024-02-21 DIAGNOSIS — R10.13 EPIGASTRIC PAIN: ICD-10-CM

## 2024-02-21 DIAGNOSIS — R11.0 NAUSEA: ICD-10-CM

## 2024-02-21 DIAGNOSIS — R13.10 DYSPHAGIA, UNSPECIFIED TYPE: Primary | ICD-10-CM

## 2024-02-21 DIAGNOSIS — R12 HEART BURN: ICD-10-CM

## 2024-02-21 PROCEDURE — 3079F DIAST BP 80-89 MM HG: CPT | Performed by: PHYSICIAN ASSISTANT

## 2024-02-21 PROCEDURE — 99214 OFFICE O/P EST MOD 30 MIN: CPT | Performed by: PHYSICIAN ASSISTANT

## 2024-02-21 PROCEDURE — 3074F SYST BP LT 130 MM HG: CPT | Performed by: PHYSICIAN ASSISTANT

## 2024-02-21 PROCEDURE — 99204 OFFICE O/P NEW MOD 45 MIN: CPT | Performed by: PHYSICIAN ASSISTANT

## 2024-02-21 PROCEDURE — 1036F TOBACCO NON-USER: CPT | Performed by: PHYSICIAN ASSISTANT

## 2024-02-21 SDOH — ECONOMIC STABILITY: FOOD INSECURITY: WITHIN THE PAST 12 MONTHS, THE FOOD YOU BOUGHT JUST DIDN'T LAST AND YOU DIDN'T HAVE MONEY TO GET MORE.: NEVER TRUE

## 2024-02-21 SDOH — ECONOMIC STABILITY: FOOD INSECURITY: WITHIN THE PAST 12 MONTHS, YOU WORRIED THAT YOUR FOOD WOULD RUN OUT BEFORE YOU GOT MONEY TO BUY MORE.: NEVER TRUE

## 2024-02-21 ASSESSMENT — PATIENT HEALTH QUESTIONNAIRE - PHQ9
SUM OF ALL RESPONSES TO PHQ9 QUESTIONS 1 AND 2: 0
2. FEELING DOWN, DEPRESSED OR HOPELESS: NOT AT ALL
1. LITTLE INTEREST OR PLEASURE IN DOING THINGS: NOT AT ALL

## 2024-02-21 ASSESSMENT — ENCOUNTER SYMPTOMS
CONFUSION: 0
DEPRESSION: 0
SHORTNESS OF BREATH: 0
JOINT SWELLING: 0
LOSS OF SENSATION IN FEET: 0
DYSURIA: 0
TROUBLE SWALLOWING: 1
OCCASIONAL FEELINGS OF UNSTEADINESS: 0
ABDOMINAL PAIN: 1
AGITATION: 0
APPETITE CHANGE: 0
CONSTIPATION: 1
DYSPHORIC MOOD: 0
POLYPHAGIA: 0
COUGH: 0
LIGHT-HEADEDNESS: 0
NAUSEA: 1
POLYDIPSIA: 0
EYE REDNESS: 0
SORE THROAT: 0
UNEXPECTED WEIGHT CHANGE: 0
BLOOD IN STOOL: 0

## 2024-02-21 ASSESSMENT — COLUMBIA-SUICIDE SEVERITY RATING SCALE - C-SSRS
1. IN THE PAST MONTH, HAVE YOU WISHED YOU WERE DEAD OR WISHED YOU COULD GO TO SLEEP AND NOT WAKE UP?: NO
6. HAVE YOU EVER DONE ANYTHING, STARTED TO DO ANYTHING, OR PREPARED TO DO ANYTHING TO END YOUR LIFE?: NO
2. HAVE YOU ACTUALLY HAD ANY THOUGHTS OF KILLING YOURSELF?: NO

## 2024-02-21 ASSESSMENT — PAIN SCALES - GENERAL: PAINLEVEL: 0-NO PAIN

## 2024-02-21 NOTE — PATIENT INSTRUCTIONS
Go to lab to obtain stool kit for H.pylori stool test.    Call [phone] to schedule esophagram.    Please read over GERD and dyspepsia educational pamphlet

## 2024-02-22 ENCOUNTER — LAB (OUTPATIENT)
Dept: LAB | Facility: LAB | Age: 34
End: 2024-02-22
Payer: COMMERCIAL

## 2024-02-22 DIAGNOSIS — R10.13 EPIGASTRIC PAIN: ICD-10-CM

## 2024-02-22 DIAGNOSIS — R11.0 NAUSEA: ICD-10-CM

## 2024-02-22 PROCEDURE — 87449 NOS EACH ORGANISM AG IA: CPT

## 2024-02-23 LAB — H PYLORI AG STL QL IA: NEGATIVE

## 2024-03-05 ENCOUNTER — PROCEDURE VISIT (OUTPATIENT)
Dept: PRIMARY CARE | Facility: CLINIC | Age: 34
End: 2024-03-05
Payer: COMMERCIAL

## 2024-03-05 VITALS
BODY MASS INDEX: 27.48 KG/M2 | HEART RATE: 98 BPM | TEMPERATURE: 96.7 F | OXYGEN SATURATION: 97 % | DIASTOLIC BLOOD PRESSURE: 96 MMHG | SYSTOLIC BLOOD PRESSURE: 140 MMHG | HEIGHT: 66 IN | WEIGHT: 171 LBS

## 2024-03-05 DIAGNOSIS — R21 RASH: Primary | ICD-10-CM

## 2024-03-05 DIAGNOSIS — Z11.3 ROUTINE SCREENING FOR STI (SEXUALLY TRANSMITTED INFECTION): ICD-10-CM

## 2024-03-05 DIAGNOSIS — Z30.09 ENCOUNTER FOR OTHER GENERAL COUNSELING OR ADVICE ON CONTRACEPTION: ICD-10-CM

## 2024-03-05 DIAGNOSIS — Z12.4 CERVICAL CANCER SCREENING: ICD-10-CM

## 2024-03-05 LAB
CLUE CELLS SPEC QL WET PREP: NORMAL
PREGNANCY TEST URINE, POC: NEGATIVE
T VAGINALIS SPEC QL WET PREP: NORMAL
WBC VAG QL WET PREP: NORMAL
YEAST VAG QL WET PREP: NORMAL

## 2024-03-05 PROCEDURE — 87800 DETECT AGNT MULT DNA DIREC: CPT

## 2024-03-05 PROCEDURE — 99213 OFFICE O/P EST LOW 20 MIN: CPT | Performed by: STUDENT IN AN ORGANIZED HEALTH CARE EDUCATION/TRAINING PROGRAM

## 2024-03-05 PROCEDURE — 96372 THER/PROPH/DIAG INJ SC/IM: CPT | Performed by: STUDENT IN AN ORGANIZED HEALTH CARE EDUCATION/TRAINING PROGRAM

## 2024-03-05 PROCEDURE — 87210 SMEAR WET MOUNT SALINE/INK: CPT

## 2024-03-05 PROCEDURE — 87661 TRICHOMONAS VAGINALIS AMPLIF: CPT

## 2024-03-05 PROCEDURE — 81025 URINE PREGNANCY TEST: CPT | Performed by: STUDENT IN AN ORGANIZED HEALTH CARE EDUCATION/TRAINING PROGRAM

## 2024-03-05 RX ORDER — MEDROXYPROGESTERONE ACETATE 150 MG/ML
150 INJECTION, SUSPENSION INTRAMUSCULAR ONCE
Status: COMPLETED | OUTPATIENT
Start: 2024-03-05 | End: 2024-03-05

## 2024-03-05 RX ORDER — DIPHENHYDRAMINE HCL 50 MG
CAPSULE ORAL
COMMUNITY
Start: 2017-03-17

## 2024-03-05 RX ORDER — TRIAMCINOLONE ACETONIDE 1 MG/G
OINTMENT TOPICAL 2 TIMES DAILY PRN
Qty: 15 G | Refills: 0 | Status: SHIPPED | OUTPATIENT
Start: 2024-03-05 | End: 2024-07-03

## 2024-03-05 RX ADMIN — MEDROXYPROGESTERONE ACETATE 150 MG: 150 INJECTION, SUSPENSION INTRAMUSCULAR at 14:17

## 2024-03-05 ASSESSMENT — PAIN SCALES - GENERAL: PAINLEVEL: 0-NO PAIN

## 2024-03-05 NOTE — PROGRESS NOTES
"Subjective   Patient ID: Jovita Montaño is a 33 y.o. female who presents for Contraception (Pt is here for her Depo and pap. ).  HPI    Cervical cancer screening  - pap requested by patient   - last pap done on 06/22/2021 negative HPV and normal cytology   - denies hx of abnormal vaginal bleeding   - endorses concerns for STI's as well as vaginal irritation     Contraception management   - on depot for ~3 years   - requesting to receive depot today but looking for another long term alternative due to concerns for weight gain     Rash   - started 1 month ago  - areas of scale that started on arm   -endorses hx of eczema     Review of Systems  As noted above  Objective   Physical Exam  Constitutional:       General: She is not in acute distress.  HENT:      Head: Normocephalic and atraumatic.      Right Ear: Tympanic membrane normal. There is no impacted cerumen.      Left Ear: Tympanic membrane normal. There is no impacted cerumen.      Mouth/Throat:      Mouth: Mucous membranes are moist.   Eyes:      Extraocular Movements: Extraocular movements intact.      Pupils: Pupils are equal, round, and reactive to light.   Cardiovascular:      Rate and Rhythm: Normal rate and regular rhythm.   Pulmonary:      Effort: Pulmonary effort is normal. No respiratory distress.   Abdominal:      General: Bowel sounds are normal. There is no distension.      Palpations: Abdomen is soft.      Tenderness: There is no abdominal tenderness. There is no guarding.   Musculoskeletal:         General: Normal range of motion.   Skin:     General: Skin is warm.      Findings: No erythema or lesion.   Neurological:      General: No focal deficit present.      Mental Status: She is alert.      Motor: No weakness.       BP (!) 140/96 (BP Location: Right arm, Patient Position: Sitting)   Pulse 98   Temp 35.9 °C (96.7 °F) (Temporal)   Ht 1.676 m (5' 6\")   Wt 77.6 kg (171 lb)   SpO2 97%   BMI 27.60 kg/m²     Assessment/Plan   Problem List " Items Addressed This Visit       Cervical cancer screening     - pap requested by patient   - last pap in 2021 normal; patient can undergo pap testing now with only cytology testing but 5 year interval with cytology and HPV co-testing recommended  - patient agreed   - will test for STI's, yeast and BV to address patients concern of possible infection and vaginal irritation          Contraceptive management - Primary     - patient on depot provera injection for 3 years   - will continue today  - depot provera not recommended for use after 2 years due to concerns for bone loss   - patient given options on other birth control methods          Relevant Orders    POCT Pregnancy, Urine manually resulted (Completed)    Rash     - patient with hx of eczema  - rash most likely due to eczema  - triamcinolone ointment ordered   - will consider derm referral if not improved         Relevant Medications    triamcinolone (Kenalog) 0.1 % ointment     Other Visit Diagnoses       Routine screening for STI (sexually transmitted infection)        Relevant Orders    Wet Prep, Genital (Completed)    C. Trachomatis / N. Gonorrhoeae, Amplified Detection (Completed)    Trichomonas vaginalis, Amplified (Completed)        RTC in April with Dr. Saldivar for contraception management    Discussed with Dr. Max Gibson  Family Medicine, PGY-3

## 2024-03-06 ENCOUNTER — APPOINTMENT (OUTPATIENT)
Dept: DERMATOLOGY | Facility: CLINIC | Age: 34
End: 2024-03-06
Payer: COMMERCIAL

## 2024-03-06 ENCOUNTER — APPOINTMENT (OUTPATIENT)
Dept: RADIOLOGY | Facility: HOSPITAL | Age: 34
End: 2024-03-06
Payer: COMMERCIAL

## 2024-03-06 LAB
C TRACH RRNA SPEC QL NAA+PROBE: NEGATIVE
N GONORRHOEA DNA SPEC QL PROBE+SIG AMP: NEGATIVE
T VAGINALIS RRNA SPEC QL NAA+PROBE: NEGATIVE

## 2024-03-13 PROBLEM — L30.9 ECZEMA: Status: RESOLVED | Noted: 2024-03-13 | Resolved: 2024-03-13

## 2024-03-13 PROBLEM — Z30.42 DEPOT CONTRACEPTION: Status: ACTIVE | Noted: 2024-03-13

## 2024-03-13 PROBLEM — R21 RASH: Status: ACTIVE | Noted: 2024-03-13

## 2024-03-13 PROBLEM — Z12.4 CERVICAL CANCER SCREENING: Status: ACTIVE | Noted: 2024-03-13

## 2024-03-13 PROBLEM — L30.9 ECZEMA: Status: ACTIVE | Noted: 2024-03-13

## 2024-03-13 PROBLEM — Z30.9 CONTRACEPTIVE MANAGEMENT: Status: ACTIVE | Noted: 2024-03-13

## 2024-03-13 NOTE — ASSESSMENT & PLAN NOTE
- patient with hx of eczema  - rash most likely due to eczema  - triamcinolone ointment ordered   - will consider derm referral if not improved

## 2024-03-13 NOTE — ASSESSMENT & PLAN NOTE
- pap requested by patient   - last pap in 2021 normal; patient can undergo pap testing now with only cytology testing but 5 year interval with cytology and HPV co-testing recommended  - patient agreed   - will test for STI's, yeast and BV to address patients concern of possible infection and vaginal irritation

## 2024-03-13 NOTE — ASSESSMENT & PLAN NOTE
- patient on depot provera injection for 3 years   - will continue today  - depot provera not recommended for use after 2 years due to concerns for bone loss   - patient given options on other birth control methods

## 2024-03-17 NOTE — PROGRESS NOTES
I reviewed the resident/fellow's documentation and discussed the patient with the resident/fellow. I agree with the resident/fellow's medical decision making as documented in the note.   Patient needs rx for calcium and Vitamin D and calcium to prevent bone loss.  Marie Santana MD

## 2024-03-20 ENCOUNTER — APPOINTMENT (OUTPATIENT)
Dept: PRIMARY CARE | Facility: CLINIC | Age: 34
End: 2024-03-20
Payer: COMMERCIAL

## 2024-03-25 ENCOUNTER — HOSPITAL ENCOUNTER (OUTPATIENT)
Dept: RADIOLOGY | Facility: HOSPITAL | Age: 34
Discharge: HOME | End: 2024-03-25
Payer: COMMERCIAL

## 2024-03-25 DIAGNOSIS — R13.10 DYSPHAGIA, UNSPECIFIED TYPE: ICD-10-CM

## 2024-03-25 DIAGNOSIS — R12 HEART BURN: ICD-10-CM

## 2024-03-25 PROCEDURE — 74220 X-RAY XM ESOPHAGUS 1CNTRST: CPT

## 2024-03-25 PROCEDURE — 2500000001 HC RX 250 WO HCPCS SELF ADMINISTERED DRUGS (ALT 637 FOR MEDICARE OP): Mod: SE | Performed by: PHYSICIAN ASSISTANT

## 2024-03-25 PROCEDURE — 74220 X-RAY XM ESOPHAGUS 1CNTRST: CPT | Performed by: RADIOLOGY

## 2024-03-25 PROCEDURE — A9698 NON-RAD CONTRAST MATERIALNOC: HCPCS | Mod: SE | Performed by: PHYSICIAN ASSISTANT

## 2024-03-25 PROCEDURE — 3430000001 HC RX 343 DIAGNOSTIC RADIOPHARMACEUTICALS: Mod: SE | Performed by: PHYSICIAN ASSISTANT

## 2024-03-25 RX ADMIN — BARIUM SULFATE 75 ML: 960 POWDER, FOR SUSPENSION ORAL at 13:38

## 2024-03-25 RX ADMIN — BARIUM SULFATE 100 ML: 980 POWDER, FOR SUSPENSION ORAL at 13:39

## 2024-03-25 RX ADMIN — ANTACID/ANTIFLATULENT 1 PACKET: 380; 550; 10; 10 GRANULE, EFFERVESCENT ORAL at 14:00

## 2024-03-25 RX ADMIN — BARIUM SULFATE 700 MG: 700 TABLET ORAL at 13:38

## 2024-03-26 ENCOUNTER — PATIENT MESSAGE (OUTPATIENT)
Dept: GASTROENTEROLOGY | Facility: HOSPITAL | Age: 34
End: 2024-03-26
Payer: COMMERCIAL

## 2024-03-26 DIAGNOSIS — R12 HEART BURN: ICD-10-CM

## 2024-03-26 DIAGNOSIS — R13.10 DYSPHAGIA, UNSPECIFIED TYPE: Primary | ICD-10-CM

## 2024-03-26 RX ORDER — PANTOPRAZOLE SODIUM 40 MG/1
40 TABLET, DELAYED RELEASE ORAL
Qty: 30 TABLET | Refills: 1 | Status: SHIPPED | OUTPATIENT
Start: 2024-03-26 | End: 2025-03-26

## 2024-04-26 NOTE — PROGRESS NOTES
Subjective     Jovita Montaño is a 33 y.o. female with     HPI      Review of Systems   All other systems reviewed and are negative.      Objective   Physical Exam  Gen: No acute distress     Psych: Alert and oriented x3     Neuro:  Normal ROM    Resp: Nonlabored respirations     CV: Regular rate and rhythm     Abd: S, NT, ND. SCARS? OBESE? MASSES?     : Deferred    Skin: Warm, dry and intact without rashes     Lymphatics: No peripheral edema       Assessment/Plan   Problem List Items Addressed This Visit    None           Plan:            Scribe Attestation  By signing my name below, I, Katelyn Casalla, Scribe   attest that this documentation has been prepared under the direction and in the presence of Dre Jc MD MPH.

## 2024-04-30 ENCOUNTER — OFFICE VISIT (OUTPATIENT)
Dept: UROLOGY | Facility: HOSPITAL | Age: 34
End: 2024-04-30
Payer: COMMERCIAL

## 2024-04-30 VITALS
RESPIRATION RATE: 18 BRPM | BODY MASS INDEX: 27.36 KG/M2 | WEIGHT: 169.53 LBS | HEART RATE: 69 BPM | DIASTOLIC BLOOD PRESSURE: 89 MMHG | SYSTOLIC BLOOD PRESSURE: 139 MMHG | TEMPERATURE: 98.4 F | OXYGEN SATURATION: 100 %

## 2024-04-30 DIAGNOSIS — N28.9 RENAL LESION: ICD-10-CM

## 2024-04-30 DIAGNOSIS — N28.1 KIDNEY CYST, ACQUIRED: Primary | ICD-10-CM

## 2024-04-30 PROCEDURE — 99203 OFFICE O/P NEW LOW 30 MIN: CPT | Performed by: NURSE PRACTITIONER

## 2024-04-30 PROCEDURE — 99213 OFFICE O/P EST LOW 20 MIN: CPT | Performed by: NURSE PRACTITIONER

## 2024-04-30 PROCEDURE — 1036F TOBACCO NON-USER: CPT | Performed by: NURSE PRACTITIONER

## 2024-04-30 ASSESSMENT — PAIN SCALES - GENERAL: PAINLEVEL: 0-NO PAIN

## 2024-04-30 ASSESSMENT — PATIENT HEALTH QUESTIONNAIRE - PHQ9
1. LITTLE INTEREST OR PLEASURE IN DOING THINGS: NOT AT ALL
2. FEELING DOWN, DEPRESSED OR HOPELESS: NOT AT ALL
SUM OF ALL RESPONSES TO PHQ9 QUESTIONS 1 AND 2: 0

## 2024-04-30 NOTE — PROGRESS NOTES
[facility]  Outpatient Clinic Note    Subjective   Jovita Montaño is a 33 y.o. female    History of Present Illness   Patient presenting today NPV s/p ED visit 03/06/2024 at [facility] for evaluation of upper abdominal pain with n/v/d. CT abd and pelvis w Contrast demnstrates a 1.6 cm lesion in the right kidney of indeterminate etiology.       Past Medical History and Surgical History   Past Medical History:   Diagnosis Date    Acute atopic conjunctivitis, bilateral 09/27/2017    Acute allergic conjunctivitis of both eyes    Acute vaginitis 09/20/2020    Recurrent vaginitis    Acute vaginitis 09/20/2020    Bacterial vaginosis    Contact with and (suspected) exposure to infections with a predominantly sexual mode of transmission 05/08/2018    Exposure to STD    Cutaneous abscess of face 04/01/2016    Cutaneous abscess of face    Elevated blood-pressure reading, without diagnosis of hypertension 05/11/2018    Elevated blood pressure reading    Encounter for gynecological examination (general) (routine) with abnormal findings 06/27/2016    Abnormal gynecological examination    Encounter for immunization 05/20/2021    Immunization due    Encounter for immunization 05/20/2021    Encounter for immunization    Encounter for initial prescription of contraceptive pills 10/17/2017    Encounter for prescription of oral contraceptives    Encounter for insertion of intrauterine contraceptive device 01/29/2016    Encounter for insertion of mirena IUD    Encounter for screening for infections with a predominantly sexual mode of transmission 10/15/2021    Routine screening for STI (sexually transmitted infection)    Encounter for screening for infections with a predominantly sexual mode of transmission 10/17/2017    Routine screening for STI (sexually transmitted infection)    Other conditions influencing health status 02/12/2015    LGSIL (low grade squamous intraepithelial dysplasia)    Other problems related to  lifestyle 05/08/2018    Other problems related to lifestyle    Other skin changes 04/26/2017    Papule of skin    Other symptoms and signs involving the genitourinary system     Possible urinary tract infection    Pain in right hand 10/17/2017    Pain of right hand    Personal history of other diseases of the female genital tract 12/28/2016    History of vaginitis    Personal history of other diseases of the female genital tract 06/03/2015    History of vaginal discharge    Personal history of other infectious and parasitic diseases     History of trichomoniasis    Personal history of other specified conditions 03/22/2016    History of urinary frequency    Personal history of other specified conditions 05/08/2018    History of abdominal pain    Personal history of other specified conditions 05/08/2018    History of dysuria     Past Surgical History:   Procedure Laterality Date    CT ANGIO CORONARY ART WITH HEARTFLOW IF SCORE >30%  4/10/2023    CT HEART CORONARY ANGIOGRAM [facility] CT       Medications  Current Outpatient Medications on File Prior to Visit   Medication Sig Dispense Refill    chlorthalidone (Hygroton) 25 mg tablet Take 0.5 tablets (12.5 mg) by mouth once daily. 15 tablet 11    cyclobenzaprine (Flexeril) 10 mg tablet Take 1 tablet (10 mg) by mouth as needed at bedtime for muscle spasms. 30 tablet 0    diphenhydrAMINE (BENADryl) 50 mg capsule Take by mouth.      EPINEPHrine 0.3 mg/0.3 mL injection syringe INJECT 0.3ML (unit dose) INTRAMUSCULARLY for anaphylaxis, go to ER if used      erythromycin-benzoyl peroxide (Benzamycin) gel Apply topically once daily in the morning. To acne spots on face 46.6 g 11    ibuprofen 800 mg tablet Take 1 tablet (800 mg) by mouth.      medroxyPROGESTERone 150 mg/mL injection Inject 1 mL (150 mg) into the shoulder, thigh, or buttocks every 3 months. 1 mL 0    pantoprazole (ProtoNix) 40 mg EC tablet Take 1 tablet (40 mg) by mouth once daily in the morning. Take before meals.  Do not crush, chew, or split. 30 tablet 1    tretinoin (Retin-A) 0.05 % cream Apply a pea-sized amount to the whole face at night as tolerated. 45 g 11    triamcinolone (Kenalog) 0.1 % ointment Apply topically 2 times a day as needed for irritation or rash. 15 g 0     No current facility-administered medications on file prior to visit.       Objective   Physicial Exam  General: Well developed, well nourished, alert and cooperative, appears in no acute distress  Eyes: Non-injected conjunctiva, sclera clear, no proptosis  Cardiac: Extremities are warm and well perfused. No edema, cyanosis or pallor.   Lungs: Breathing is easy, non-labored. Speaking in clear and complete sentences. Normal diaphragmatic movement.  MSK: Ambulatory with steady gait, unassisted  Neuro: alert and oriented to person, place and time  Psych: Demonstrates good judgement and reason, without hallucinations, abnormal affect or abnormal behaviors.  Skin: no obvious lesions, no rashes.    Review of Systems  All other systems have been reviewed and are negative for complaint.      Assessment and Plan     All questions and concerns were addressed. Patient verbalizes understanding and has no other questions at this time.   You are able to have email access to your chart. You can sign into [employer] or add the [employer] rhonda on your smart phone to review today's visit, laboratory work and imaging.   If you have any questions about your care, do not hesitate to call and leave a message, we return calls in a timely manner.    Mallory Medina-- TOMER LYN  Office Phone:  [phone]

## 2024-04-30 NOTE — PROGRESS NOTES
[facility]  Outpatient Clinic Note    Subjective   Jovita Montaño is a 33 y.o. female    History of Present Illness   Patient presenting today NPV s/p ED visit 03/06/2024 at [facility] for evaluation of upper abdominal pain with n/v/d. CT abd and pelvis w Contrast demnstrates a 1.6 cm lesion in the right kidney of indeterminate etiology.  Patient has no bothersome urinary symptoms. Patient denies gross hematuria, dysuria, frequency, fevers, n/v, or flank pain.   No family or personal history of stones or  malignancy.      Past Medical History and Surgical History   Past Medical History:   Diagnosis Date    Acute atopic conjunctivitis, bilateral 09/27/2017    Acute allergic conjunctivitis of both eyes    Acute vaginitis 09/20/2020    Recurrent vaginitis    Acute vaginitis 09/20/2020    Bacterial vaginosis    Contact with and (suspected) exposure to infections with a predominantly sexual mode of transmission 05/08/2018    Exposure to STD    Cutaneous abscess of face 04/01/2016    Cutaneous abscess of face    Elevated blood-pressure reading, without diagnosis of hypertension 05/11/2018    Elevated blood pressure reading    Encounter for gynecological examination (general) (routine) with abnormal findings 06/27/2016    Abnormal gynecological examination    Encounter for immunization 05/20/2021    Immunization due    Encounter for immunization 05/20/2021    Encounter for immunization    Encounter for initial prescription of contraceptive pills 10/17/2017    Encounter for prescription of oral contraceptives    Encounter for insertion of intrauterine contraceptive device 01/29/2016    Encounter for insertion of mirena IUD    Encounter for screening for infections with a predominantly sexual mode of transmission 10/15/2021    Routine screening for STI (sexually transmitted infection)    Encounter for screening for infections with a predominantly sexual mode of transmission 10/17/2017    Routine screening  for STI (sexually transmitted infection)    Other conditions influencing health status 02/12/2015    LGSIL (low grade squamous intraepithelial dysplasia)    Other problems related to lifestyle 05/08/2018    Other problems related to lifestyle    Other skin changes 04/26/2017    Papule of skin    Other symptoms and signs involving the genitourinary system     Possible urinary tract infection    Pain in right hand 10/17/2017    Pain of right hand    Personal history of other diseases of the female genital tract 12/28/2016    History of vaginitis    Personal history of other diseases of the female genital tract 06/03/2015    History of vaginal discharge    Personal history of other infectious and parasitic diseases     History of trichomoniasis    Personal history of other specified conditions 03/22/2016    History of urinary frequency    Personal history of other specified conditions 05/08/2018    History of abdominal pain    Personal history of other specified conditions 05/08/2018    History of dysuria     Past Surgical History:   Procedure Laterality Date    CT ANGIO CORONARY ART WITH HEARTFLOW IF SCORE >30%  4/10/2023    CT HEART CORONARY ANGIOGRAM [facility] CT       Medications  Current Outpatient Medications on File Prior to Visit   Medication Sig Dispense Refill    chlorthalidone (Hygroton) 25 mg tablet Take 0.5 tablets (12.5 mg) by mouth once daily. 15 tablet 11    cyclobenzaprine (Flexeril) 10 mg tablet Take 1 tablet (10 mg) by mouth as needed at bedtime for muscle spasms. 30 tablet 0    diphenhydrAMINE (BENADryl) 50 mg capsule Take by mouth.      EPINEPHrine 0.3 mg/0.3 mL injection syringe INJECT 0.3ML (unit dose) INTRAMUSCULARLY for anaphylaxis, go to ER if used      erythromycin-benzoyl peroxide (Benzamycin) gel Apply topically once daily in the morning. To acne spots on face 46.6 g 11    ibuprofen 800 mg tablet Take 1 tablet (800 mg) by mouth.      medroxyPROGESTERone 150 mg/mL injection Inject 1 mL (150  mg) into the shoulder, thigh, or buttocks every 3 months. 1 mL 0    pantoprazole (ProtoNix) 40 mg EC tablet Take 1 tablet (40 mg) by mouth once daily in the morning. Take before meals. Do not crush, chew, or split. 30 tablet 1    tretinoin (Retin-A) 0.05 % cream Apply a pea-sized amount to the whole face at night as tolerated. 45 g 11    triamcinolone (Kenalog) 0.1 % ointment Apply topically 2 times a day as needed for irritation or rash. 15 g 0     No current facility-administered medications on file prior to visit.       Objective   Physicial Exam  General: Well developed, well nourished, alert and cooperative, appears in no acute distress  Eyes: Non-injected conjunctiva, sclera clear, no proptosis  Cardiac: Extremities are warm and well perfused. No edema, cyanosis or pallor.   Lungs: Breathing is easy, non-labored. Speaking in clear and complete sentences. Normal diaphragmatic movement.  MSK: Ambulatory with steady gait, unassisted  Neuro: alert and oriented to person, place and time  Psych: Demonstrates good judgement and reason, without hallucinations, abnormal affect or abnormal behaviors.  Skin: no obvious lesions, no rashes.    Review of Systems  All other systems have been reviewed and are negative for complaint.      Assessment and Plan     All questions and concerns were addressed. Patient verbalizes understanding and has no other questions at this time.   You are able to have email access to your chart. You can sign into [employer] or add the [employer] rhonda on your smart phone to review today's visit, laboratory work and imaging.   If you have any questions about your care, do not hesitate to call and leave a message, we return calls in a timely manner.    Mallory Medina-- TOMER LYN  Office Phone:  [phone]

## 2024-04-30 NOTE — PROGRESS NOTES
[facility]  Outpatient Clinic Note    Subjective   Jovita Montaño is a 33 y.o. female    History of Present Illness   Patient presenting to clinic today s/p ED visit for evaluation of upper abdominal pain with n/v/d.   CT Abd and Pelvis w Contrast demonstrates 1.6 cm hypodense lesion in the right kidney of indeterminate etiology.     Urine and Pregnancy tests negative. She has no bothersome urinary symptoms. Patient denies gross hematuria, dysuria, frequency, fevers, n/v, or flank pain. No family or personal history of  malignancy. No history of UTI     Past Medical History and Surgical History   Past Medical History:   Diagnosis Date    Acute atopic conjunctivitis, bilateral 09/27/2017    Acute allergic conjunctivitis of both eyes    Acute vaginitis 09/20/2020    Recurrent vaginitis    Acute vaginitis 09/20/2020    Bacterial vaginosis    Contact with and (suspected) exposure to infections with a predominantly sexual mode of transmission 05/08/2018    Exposure to STD    Cutaneous abscess of face 04/01/2016    Cutaneous abscess of face    Elevated blood-pressure reading, without diagnosis of hypertension 05/11/2018    Elevated blood pressure reading    Encounter for gynecological examination (general) (routine) with abnormal findings 06/27/2016    Abnormal gynecological examination    Encounter for immunization 05/20/2021    Immunization due    Encounter for immunization 05/20/2021    Encounter for immunization    Encounter for initial prescription of contraceptive pills 10/17/2017    Encounter for prescription of oral contraceptives    Encounter for insertion of intrauterine contraceptive device 01/29/2016    Encounter for insertion of mirena IUD    Encounter for screening for infections with a predominantly sexual mode of transmission 10/15/2021    Routine screening for STI (sexually transmitted infection)    Encounter for screening for infections with a predominantly sexual mode of transmission  10/17/2017    Routine screening for STI (sexually transmitted infection)    Other conditions influencing health status 02/12/2015    LGSIL (low grade squamous intraepithelial dysplasia)    Other problems related to lifestyle 05/08/2018    Other problems related to lifestyle    Other skin changes 04/26/2017    Papule of skin    Other symptoms and signs involving the genitourinary system     Possible urinary tract infection    Pain in right hand 10/17/2017    Pain of right hand    Personal history of other diseases of the female genital tract 12/28/2016    History of vaginitis    Personal history of other diseases of the female genital tract 06/03/2015    History of vaginal discharge    Personal history of other infectious and parasitic diseases     History of trichomoniasis    Personal history of other specified conditions 03/22/2016    History of urinary frequency    Personal history of other specified conditions 05/08/2018    History of abdominal pain    Personal history of other specified conditions 05/08/2018    History of dysuria     Past Surgical History:   Procedure Laterality Date    CT ANGIO CORONARY ART WITH HEARTFLOW IF SCORE >30%  4/10/2023    CT HEART CORONARY ANGIOGRAM [facility] CT       Medications  Current Outpatient Medications on File Prior to Visit   Medication Sig Dispense Refill    chlorthalidone (Hygroton) 25 mg tablet Take 0.5 tablets (12.5 mg) by mouth once daily. 15 tablet 11    cyclobenzaprine (Flexeril) 10 mg tablet Take 1 tablet (10 mg) by mouth as needed at bedtime for muscle spasms. 30 tablet 0    diphenhydrAMINE (BENADryl) 50 mg capsule Take by mouth.      EPINEPHrine 0.3 mg/0.3 mL injection syringe INJECT 0.3ML (unit dose) INTRAMUSCULARLY for anaphylaxis, go to ER if used      erythromycin-benzoyl peroxide (Benzamycin) gel Apply topically once daily in the morning. To acne spots on face 46.6 g 11    ibuprofen 800 mg tablet Take 1 tablet (800 mg) by mouth.      medroxyPROGESTERone 150  mg/mL injection Inject 1 mL (150 mg) into the shoulder, thigh, or buttocks every 3 months. 1 mL 0    pantoprazole (ProtoNix) 40 mg EC tablet Take 1 tablet (40 mg) by mouth once daily in the morning. Take before meals. Do not crush, chew, or split. 30 tablet 1    tretinoin (Retin-A) 0.05 % cream Apply a pea-sized amount to the whole face at night as tolerated. 45 g 11    triamcinolone (Kenalog) 0.1 % ointment Apply topically 2 times a day as needed for irritation or rash. 15 g 0     No current facility-administered medications on file prior to visit.       Objective   Physicial Exam  General: Well developed, well nourished, alert and cooperative, appears in no acute distress  Eyes: Non-injected conjunctiva, sclera clear, no proptosis  Cardiac: Extremities are warm and well perfused. No edema, cyanosis or pallor.   Lungs: Breathing is easy, non-labored. Speaking in clear and complete sentences. Normal diaphragmatic movement.  MSK: Ambulatory with steady gait, unassisted  Neuro: alert and oriented to person, place and time  Psych: Demonstrates good judgement and reason, without hallucinations, abnormal affect or abnormal behaviors.  Skin: no obvious lesions, no rashes.    Review of Systems  All other systems have been reviewed and are negative for complaint.    Assessment and Plan   Patient presenting to clinic today s/p ED visit for evaluation of upper abdominal pain with n/v/d.   CT Abd and Pelvis w Contrast demonstrates 1.6 cm hypodense lesion in the right kidney of indeterminate etiology.     Urine and Pregnancy tests negative. She has no bothersome urinary symptoms. Patient denies gross hematuria, dysuria, frequency, fevers, n/v, or flank pain. No family or personal history of  malignancy. No history of UTI     -Renal lesion   1.6 cm hypodense lesion in the right kidney of indeterminate etiology  Patient will be scheduled for Renal CT w and w/o Contrast in 6 months and follow up with Dr. Jc, sooner if  needed.     All questions and concerns were addressed. Patient verbalizes understanding and has no other questions at this time.   You are able to have email access to your chart. You can sign into [employer] or add the [facility] rhonda on your smart phone to review today's visit, laboratory work and imaging.   If you have any questions about your care, do not hesitate to call and leave a message, we return calls in a timely manner.    Mallory Medina-- TOMER LYN  Office Phone:  [phone]

## 2024-05-17 ENCOUNTER — TELEMEDICINE (OUTPATIENT)
Dept: PRIMARY CARE | Facility: CLINIC | Age: 34
End: 2024-05-17
Payer: COMMERCIAL

## 2024-05-17 DIAGNOSIS — G56.01 CARPAL TUNNEL SYNDROME OF RIGHT WRIST: Primary | ICD-10-CM

## 2024-05-17 PROCEDURE — 99213 OFFICE O/P EST LOW 20 MIN: CPT | Performed by: STUDENT IN AN ORGANIZED HEALTH CARE EDUCATION/TRAINING PROGRAM

## 2024-05-17 NOTE — PROGRESS NOTES
Subjective   Patient ID: Jovita Montaño is a 33 y.o. female with a hx of HTN who presents for Follow-up.  Virtual or Telephone Consent    A telephone visit (audio only) between the patient (at the originating site) and the provider (at the distant site) was utilized to provide this telehealth service.   Verbal consent was requested and obtained from Jovita Montaño on this date, 05/17/2024 for a telehealth visit.      HPI    Hx of carpal tunnel   - pain in R. Hand at times has difficulty holding objects   - wears a brace; has worn one for several months with no relief   - receives cortisone injections every 6 months   - patient works in animal husbandry at [employer]  - patient at times misses 4-5 days/month due to right hand pain   - requesting renewal of FMLA paper work     Review of Systems  As noted above   Objective   Physical Exam  Virtual visit  - clear voice   - linear thought pattern   There were no vitals taken for this visit.    Assessment/Plan   Problem List Items Addressed This Visit       Carpal tunnel syndrome of right wrist - Primary     - refractory to conservative therapy   - c/w steroid injections q6 months   - FMLA paper work filled          RTC in 3 months for HM     Discussed with Dr. Donavon Gibson  Family Medicine, PGY-3

## 2024-05-28 ENCOUNTER — TELEPHONE (OUTPATIENT)
Dept: PRIMARY CARE | Facility: CLINIC | Age: 34
End: 2024-05-28

## 2024-05-28 ENCOUNTER — CLINICAL SUPPORT (OUTPATIENT)
Dept: PRIMARY CARE | Facility: CLINIC | Age: 34
End: 2024-05-28
Payer: COMMERCIAL

## 2024-05-28 VITALS — SYSTOLIC BLOOD PRESSURE: 149 MMHG | DIASTOLIC BLOOD PRESSURE: 95 MMHG | HEART RATE: 96 BPM

## 2024-05-28 DIAGNOSIS — Z30.42 ENCOUNTER FOR SURVEILLANCE OF INJECTABLE CONTRACEPTIVE: ICD-10-CM

## 2024-05-28 PROCEDURE — 96372 THER/PROPH/DIAG INJ SC/IM: CPT | Performed by: FAMILY MEDICINE

## 2024-05-28 RX ORDER — MEDROXYPROGESTERONE ACETATE 150 MG/ML
150 INJECTION, SUSPENSION INTRAMUSCULAR ONCE
Status: COMPLETED | OUTPATIENT
Start: 2024-05-28 | End: 2024-05-28

## 2024-05-28 RX ADMIN — MEDROXYPROGESTERONE ACETATE 150 MG: 150 INJECTION, SUSPENSION INTRAMUSCULAR at 14:09

## 2024-05-28 NOTE — PROGRESS NOTES
Pt in clinic to receive Depo shot. BP WNL. Administered to R ventrogluteal site without event. Pt stated that this will be her last dose of Depo, as she would like to take a break following 3 years of contraception method. Pt confirms that she is interested in an alternate form of birth control, either the pill, or possibly nexplanon. Message sent to the provider. No calendar given d/t this reason.

## 2024-05-28 NOTE — TELEPHONE ENCOUNTER
Copied from CRM #[id number]. Topic: Information Request - Doctor, Hospital, or Provider  >> May 16, 2024  9:54 AM Josselyn WERNER wrote:  Pt stated  told her she could do a virtual tomorrow. The appointment was not changed and she is worried she will not et the link. It is not giving me access to change it virtually.

## 2024-05-30 DIAGNOSIS — Z30.9 ENCOUNTER FOR CONTRACEPTIVE MANAGEMENT, UNSPECIFIED TYPE: ICD-10-CM

## 2024-05-30 DIAGNOSIS — E55.9 VITAMIN D DEFICIENCY: Primary | ICD-10-CM

## 2024-05-30 RX ORDER — NORETHINDRONE 0.35 MG/1
1 TABLET ORAL DAILY
Qty: 28 TABLET | Refills: 12 | Status: SHIPPED | OUTPATIENT
Start: 2024-05-30 | End: 2025-05-30

## 2024-05-30 RX ORDER — CHOLECALCIFEROL (VITAMIN D3) 50 MCG
50 TABLET ORAL DAILY
Qty: 30 TABLET | Refills: 11 | Status: SHIPPED | OUTPATIENT
Start: 2024-05-30 | End: 2025-05-30

## 2024-05-30 NOTE — ASSESSMENT & PLAN NOTE
- refractory to conservative therapy   - c/w steroid injections q6 months   - FMLA paper work filled

## 2024-05-30 NOTE — PROGRESS NOTES
I reviewed the resident/fellow's documentation and discussed the patient with the resident/fellow. I agree with the resident/fellow's medical decision making as documented in the note.    Chris Raphael MD

## 2024-06-06 PROCEDURE — 86706 HEP B SURFACE ANTIBODY: CPT

## 2024-06-07 ENCOUNTER — LAB REQUISITION (OUTPATIENT)
Dept: LAB | Facility: HOSPITAL | Age: 34
End: 2024-06-07
Payer: COMMERCIAL

## 2024-06-07 DIAGNOSIS — Z01.84 ENCOUNTER FOR ANTIBODY RESPONSE EXAMINATION: ICD-10-CM

## 2024-06-07 LAB — HBV SURFACE AB SER-ACNC: 172.3 MIU/ML

## 2024-08-02 DIAGNOSIS — M54.50 LOW BACK PAIN WITHOUT SCIATICA, UNSPECIFIED BACK PAIN LATERALITY, UNSPECIFIED CHRONICITY: ICD-10-CM

## 2024-08-06 RX ORDER — CYCLOBENZAPRINE HCL 10 MG
10 TABLET ORAL NIGHTLY PRN
Qty: 30 TABLET | Refills: 2 | OUTPATIENT
Start: 2024-08-06 | End: 2025-04-03

## 2024-10-30 ENCOUNTER — APPOINTMENT (OUTPATIENT)
Dept: RADIOLOGY | Facility: HOSPITAL | Age: 34
End: 2024-10-30
Payer: COMMERCIAL

## 2025-04-02 ENCOUNTER — APPOINTMENT (OUTPATIENT)
Dept: DERMATOLOGY | Facility: CLINIC | Age: 35
End: 2025-04-02
Payer: COMMERCIAL

## 2025-04-02 DIAGNOSIS — L70.0 ACNE VULGARIS: Primary | ICD-10-CM

## 2025-04-02 PROCEDURE — 99214 OFFICE O/P EST MOD 30 MIN: CPT | Performed by: DERMATOLOGY

## 2025-04-02 PROCEDURE — 1036F TOBACCO NON-USER: CPT | Performed by: DERMATOLOGY

## 2025-04-02 RX ORDER — CLINDAMYCIN PHOSPHATE 10 MG/ML
1 SOLUTION TOPICAL 2 TIMES DAILY
Qty: 69 EACH | Refills: 11 | Status: SHIPPED | OUTPATIENT
Start: 2025-04-02 | End: 2025-04-04 | Stop reason: WASHOUT

## 2025-04-02 RX ORDER — TRETINOIN 0.5 MG/G
CREAM TOPICAL
Qty: 45 G | Refills: 11 | Status: SHIPPED | OUTPATIENT
Start: 2025-04-02

## 2025-04-02 RX ORDER — ERYTHROMYCIN AND BENZOYL PEROXIDE 30; 50 MG/G; MG/G
GEL TOPICAL EVERY MORNING
Qty: 46.6 G | Refills: 11 | Status: SHIPPED | OUTPATIENT
Start: 2025-04-02 | End: 2025-04-04 | Stop reason: WASHOUT

## 2025-04-02 NOTE — PROGRESS NOTES
Subjective     Jovita Montaño is a 34 y.o. female who presents for the following: Acne. Last derm visit 12/6/23 for acne. She has been using topicals. She ran out. When she had topicals her acne was well-controlled. When she ran out she flared again    Review of Systems:  No other skin or systemic complaints other than what is documented elsewhere in the note.    The following portions of the chart were reviewed this encounter and updated as appropriate:   Tobacco  Allergies  Meds  Problems  Med Hx  Surg Hx         Skin Cancer History  No skin cancer on file.      Specialty Problems          Dermatology Problems    Acne    Rash        Objective   Well appearing patient in no apparent distress; mood and affect are within normal limits.    A focused skin examination was performed. All findings within normal limits unless otherwise noted below.    Assessment/Plan   1. Acne vulgaris  Head - Anterior (Face)  Less deep and more superificial now inflammatory papules with associated brown macules and excoriation on lower cheeks and jawline    - flaring while off topicals again  - re-send Rx to restart  - she wears surgical mask for work - will also send clindamycin swabs    Related Medications  clindamycin (Cleocin T) 1 % swab  Apply 1 Application topically 2 times a day. As needed to acne-prone areas of face immediately after taking of mask    erythromycin-benzoyl peroxide (Benzamycin) gel  Apply topically once daily in the morning. To acne spots on face    tretinoin (Retin-A) 0.05 % cream  Apply a pea-sized amount to the whole face at night as tolerated.        Follow up as needed   Refills x1 year    Virtual or Telephone Consent    An interactive audio and video telecommunication system which permits real time communications between the patient (at the originating site) and provider (at the distant site) was utilized to provide this telehealth service.   Verbal consent was requested and obtained from Jovita DIGGS  Tatum on this date, 04/02/25 for a telehealth visit and the patient's location was confirmed at the time of the visit.

## 2025-04-04 ENCOUNTER — OFFICE VISIT (OUTPATIENT)
Facility: HOSPITAL | Age: 35
End: 2025-04-04
Payer: COMMERCIAL

## 2025-04-04 VITALS
HEART RATE: 87 BPM | SYSTOLIC BLOOD PRESSURE: 148 MMHG | OXYGEN SATURATION: 99 % | HEIGHT: 66 IN | WEIGHT: 175.1 LBS | DIASTOLIC BLOOD PRESSURE: 100 MMHG | BODY MASS INDEX: 28.14 KG/M2

## 2025-04-04 DIAGNOSIS — T30.0 SCALD BURN: ICD-10-CM

## 2025-04-04 DIAGNOSIS — Z11.3 SCREENING EXAMINATION FOR STD (SEXUALLY TRANSMITTED DISEASE): ICD-10-CM

## 2025-04-04 DIAGNOSIS — Z13.1 DIABETES MELLITUS SCREENING: ICD-10-CM

## 2025-04-04 DIAGNOSIS — Z00.00 ROUTINE GENERAL MEDICAL EXAMINATION AT A HEALTH CARE FACILITY: Primary | ICD-10-CM

## 2025-04-04 DIAGNOSIS — I10 PRIMARY HYPERTENSION: ICD-10-CM

## 2025-04-04 DIAGNOSIS — Z30.011 ENCOUNTER FOR INITIAL PRESCRIPTION OF CONTRACEPTIVE PILLS: ICD-10-CM

## 2025-04-04 DIAGNOSIS — N89.8 VAGINAL DISCHARGE: ICD-10-CM

## 2025-04-04 DIAGNOSIS — Z72.51 HISTORY OF UNPROTECTED SEX: ICD-10-CM

## 2025-04-04 LAB — PREGNANCY TEST URINE, POC: NEGATIVE

## 2025-04-04 PROCEDURE — 81025 URINE PREGNANCY TEST: CPT

## 2025-04-04 RX ORDER — CHOLECALCIFEROL (VITAMIN D3) 25 MCG
1000 TABLET ORAL DAILY
Qty: 30 TABLET | Refills: 11 | Status: SHIPPED | OUTPATIENT
Start: 2025-04-04 | End: 2026-04-04

## 2025-04-04 RX ORDER — CHLORTHALIDONE 25 MG/1
12.5 TABLET ORAL DAILY
Qty: 45 TABLET | Refills: 3 | Status: SHIPPED | OUTPATIENT
Start: 2025-04-04 | End: 2026-04-04

## 2025-04-04 RX ORDER — DROSPIRENONE AND ETHINYL ESTRADIOL 0.02-3(28)
1 KIT ORAL DAILY
Qty: 28 TABLET | Refills: 12 | Status: SHIPPED | OUTPATIENT
Start: 2025-04-04 | End: 2026-04-04

## 2025-04-04 ASSESSMENT — PAIN SCALES - GENERAL: PAINLEVEL_OUTOF10: 5
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
## 2025-04-05 LAB
ALBUMIN SERPL-MCNC: 4.3 G/DL (ref 3.6–5.1)
ALBUMIN/CREAT UR: NORMAL
ALP SERPL-CCNC: 68 U/L (ref 31–125)
ALT SERPL-CCNC: 13 U/L (ref 6–29)
ANION GAP SERPL CALCULATED.4IONS-SCNC: 9 MMOL/L (CALC) (ref 7–17)
AST SERPL-CCNC: 13 U/L (ref 10–30)
BILIRUB SERPL-MCNC: 0.2 MG/DL (ref 0.2–1.2)
BUN SERPL-MCNC: 12 MG/DL (ref 7–25)
C TRACH RRNA SPEC QL NAA+PROBE: NOT DETECTED
CALCIUM SERPL-MCNC: 9.6 MG/DL (ref 8.6–10.2)
CHLORIDE SERPL-SCNC: 106 MMOL/L (ref 98–110)
CO2 SERPL-SCNC: 23 MMOL/L (ref 20–32)
CREAT SERPL-MCNC: 0.72 MG/DL (ref 0.5–0.97)
CREAT UR-MCNC: NORMAL MG/DL
EGFRCR SERPLBLD CKD-EPI 2021: 112 ML/MIN/1.73M2
EST. AVERAGE GLUCOSE BLD GHB EST-MCNC: 114 MG/DL
EST. AVERAGE GLUCOSE BLD GHB EST-SCNC: 6.3 MMOL/L
GLUCOSE SERPL-MCNC: 78 MG/DL (ref 65–99)
HBA1C MFR BLD: 5.6 % OF TOTAL HGB
HCV AB SERPL QL IA: NORMAL
HIV 1+2 AB+HIV1 P24 AG SERPL QL IA: NORMAL
MICROALBUMIN UR-MCNC: NORMAL
N GONORRHOEA RRNA SPEC QL NAA+PROBE: NOT DETECTED
POTASSIUM SERPL-SCNC: 4.2 MMOL/L (ref 3.5–5.3)
PROT SERPL-MCNC: 7.4 G/DL (ref 6.1–8.1)
QUEST GC CT AMPLIFIED (ALWAYS MESSAGE): NORMAL
SODIUM SERPL-SCNC: 138 MMOL/L (ref 135–146)
T PALLIDUM AB SER QL IA: NORMAL
T VAGINALIS RRNA SPEC QL NAA+PROBE: NOT DETECTED

## 2025-04-07 LAB
C TRACH RRNA SPEC QL NAA+PROBE: NOT DETECTED
CREAT UR-MCNC: NORMAL MG/DL
MICROALBUMIN UR-MCNC: NORMAL MG/DL
N GONORRHOEA RRNA SPEC QL NAA+PROBE: NOT DETECTED
QUEST GC CT AMPLIFIED (ALWAYS MESSAGE): NORMAL
T VAGINALIS RRNA SPEC QL NAA+PROBE: NOT DETECTED

## 2025-04-08 ENCOUNTER — PATIENT MESSAGE (OUTPATIENT)
Dept: DERMATOLOGY | Facility: CLINIC | Age: 35
End: 2025-04-08
Payer: COMMERCIAL

## 2025-04-08 DIAGNOSIS — L70.0 ACNE VULGARIS: ICD-10-CM

## 2025-04-09 DIAGNOSIS — F41.9 ANXIETY: Primary | ICD-10-CM

## 2025-04-09 DIAGNOSIS — G89.29 CHRONIC MIDLINE LOW BACK PAIN WITHOUT SCIATICA: ICD-10-CM

## 2025-04-09 DIAGNOSIS — M54.50 CHRONIC MIDLINE LOW BACK PAIN WITHOUT SCIATICA: ICD-10-CM

## 2025-04-09 LAB
ALBUMIN SERPL-MCNC: 4.3 G/DL (ref 3.6–5.1)
ALP SERPL-CCNC: 68 U/L (ref 31–125)
ALT SERPL-CCNC: 13 U/L (ref 6–29)
ANION GAP SERPL CALCULATED.4IONS-SCNC: 9 MMOL/L (CALC) (ref 7–17)
AST SERPL-CCNC: 13 U/L (ref 10–30)
BILIRUB SERPL-MCNC: 0.2 MG/DL (ref 0.2–1.2)
BUN SERPL-MCNC: 12 MG/DL (ref 7–25)
CALCIUM SERPL-MCNC: 9.6 MG/DL (ref 8.6–10.2)
CHLORIDE SERPL-SCNC: 106 MMOL/L (ref 98–110)
CO2 SERPL-SCNC: 23 MMOL/L (ref 20–32)
CREAT SERPL-MCNC: 0.72 MG/DL (ref 0.5–0.97)
EGFRCR SERPLBLD CKD-EPI 2021: 112 ML/MIN/1.73M2
EST. AVERAGE GLUCOSE BLD GHB EST-MCNC: 114 MG/DL
EST. AVERAGE GLUCOSE BLD GHB EST-SCNC: 6.3 MMOL/L
GLUCOSE SERPL-MCNC: 78 MG/DL (ref 65–99)
HBA1C MFR BLD: 5.6 % OF TOTAL HGB
HCV AB SERPL QL IA: NORMAL
HIV 1+2 AB+HIV1 P24 AG SERPL QL IA: NORMAL
POTASSIUM SERPL-SCNC: 4.2 MMOL/L (ref 3.5–5.3)
PROT SERPL-MCNC: 7.4 G/DL (ref 6.1–8.1)
SODIUM SERPL-SCNC: 138 MMOL/L (ref 135–146)
T PALLIDUM AB SER QL IA: NEGATIVE

## 2025-04-09 NOTE — PROGRESS NOTES
Called patient regarding FMLA. H/o chronic low back pain in the setting of scoliosis, right carpal tunnel syndrome and anxiety. Has not been following ortho or PT recently but states she has virtual therapist twice a month for her anxiety.     Patient had also endorses intermittent shortness of breat since her last covid infection in December 2024. States albuterol has relieved her symptom in the past. No diagnosis of asthma. Non-smoker. No chest pain, sputum, fever or chills.   Agreed for trial of albuterol PRN and will continue to monitor.     Recent wet prep was ordered, swab taken but error during the processing. Patient endorses some vaginal discharge but no foul smelling, pruritus or pain. Has follow up on 4/28/25, agreed to repeat wet prep then if sx persistent. Will return sooner if worsening sx.     Kip Sullivan MD  Family Medicine PGY3

## 2025-04-10 ENCOUNTER — TELEPHONE (OUTPATIENT)
Facility: HOSPITAL | Age: 35
End: 2025-04-10
Payer: COMMERCIAL

## 2025-04-10 DIAGNOSIS — U09.9 POST-COVID CHRONIC DYSPNEA: Primary | ICD-10-CM

## 2025-04-10 DIAGNOSIS — R06.09 POST-COVID CHRONIC DYSPNEA: Primary | ICD-10-CM

## 2025-04-10 RX ORDER — ALBUTEROL SULFATE 90 UG/1
2 INHALANT RESPIRATORY (INHALATION) EVERY 4 HOURS PRN
Qty: 6.7 G | Refills: 3 | Status: SHIPPED | OUTPATIENT
Start: 2025-04-10 | End: 2026-04-10

## 2025-04-11 RX ORDER — CLINDAMYCIN PHOSPHATE 10 MG/ML
1 SOLUTION TOPICAL 2 TIMES DAILY
Qty: 69 EACH | Refills: 11 | Status: SHIPPED | OUTPATIENT
Start: 2025-04-11

## 2025-04-28 ENCOUNTER — APPOINTMENT (OUTPATIENT)
Facility: HOSPITAL | Age: 35
End: 2025-04-28
Payer: COMMERCIAL

## (undated) DEVICE — GLOVE, SURGICAL, PROTEXIS PI BLUE W/NEUTHERA, 7.0, PF, LF

## (undated) DEVICE — DRESSING, GAUZE, SPONGE, 12 PLY, 4 X 4 IN, PLASTIC POUCH, STRL 10PK

## (undated) DEVICE — STOCKINETTE, DOUBLE PLY, 4 X 48 IN, STERILE

## (undated) DEVICE — GLOVE, SURGICAL, PROTEXIS PI , 7.5, PF, LF

## (undated) DEVICE — GLOVE, SURGICAL, PROTEXIS PI , 7.0, PF, LF

## (undated) DEVICE — GLOVE, SURGICAL, PROTEXIS PI , 6.5, PF, LF

## (undated) DEVICE — SUTURE, VICRYL, 4-0, 18 IN, P-3, UNDYED

## (undated) DEVICE — SUTURE, MONOCRYLIC, 4-0, P3, MONO 18

## (undated) DEVICE — Device

## (undated) DEVICE — PADDING, WEBRIL, UNDERCAST, STERILE, 3 IN